# Patient Record
Sex: FEMALE | Race: WHITE | NOT HISPANIC OR LATINO | URBAN - METROPOLITAN AREA
[De-identification: names, ages, dates, MRNs, and addresses within clinical notes are randomized per-mention and may not be internally consistent; named-entity substitution may affect disease eponyms.]

---

## 2022-02-09 ENCOUNTER — APPOINTMENT (RX ONLY)
Dept: URBAN - METROPOLITAN AREA CLINIC 12 | Facility: CLINIC | Age: 27
Setting detail: DERMATOLOGY
End: 2022-02-09

## 2022-02-09 DIAGNOSIS — L65.0 TELOGEN EFFLUVIUM: ICD-10-CM | Status: INADEQUATELY CONTROLLED

## 2022-02-09 PROCEDURE — ? DIAGNOSIS COMMENT

## 2022-02-09 PROCEDURE — ? ADDITIONAL NOTES

## 2022-02-09 PROCEDURE — 99204 OFFICE O/P NEW MOD 45 MIN: CPT

## 2022-02-09 PROCEDURE — ? COUNSELING

## 2022-02-09 PROCEDURE — ? LAB REPORTS REVIEWED

## 2022-02-09 NOTE — PROCEDURE: ADDITIONAL NOTES
Detail Level: Simple
Render Risk Assessment In Note?: no
Additional Notes: Dd bx, monitor, rogaine, spirono.  Pt will monitor

## 2022-02-09 NOTE — HPI: HAIR LOSS
How Did The Hair Loss Occur?: sudden in onset
How Severe Is Your Hair Loss?: moderate
What Hair Products Do You Use?: Biomatrix
Additional History: Declines fbe

## 2022-05-09 ENCOUNTER — APPOINTMENT (RX ONLY)
Dept: URBAN - METROPOLITAN AREA CLINIC 12 | Facility: CLINIC | Age: 27
Setting detail: DERMATOLOGY
End: 2022-05-09

## 2022-05-09 DIAGNOSIS — L65.0 TELOGEN EFFLUVIUM: ICD-10-CM | Status: STABLE

## 2022-05-09 PROCEDURE — 99213 OFFICE O/P EST LOW 20 MIN: CPT

## 2022-05-09 PROCEDURE — ? LAB REPORTS REVIEWED

## 2022-05-09 PROCEDURE — ? DIAGNOSIS COMMENT

## 2022-05-09 PROCEDURE — ? COUNSELING

## 2022-05-09 PROCEDURE — ? ADDITIONAL NOTES

## 2022-05-09 NOTE — PROCEDURE: ADDITIONAL NOTES
Detail Level: Simple
Render Risk Assessment In Note?: no
Additional Notes: Dd bx, monitor, rogaine, spirono.  Pt will monitor.  Agreeable.  Hairline stable

## 2022-05-09 NOTE — PROCEDURE: DIAGNOSIS COMMENT
Comment: Unmasking FPHL?  No evidence of inflammatory conditions like LP.
Detail Level: Simple
Render Risk Assessment In Note?: no

## 2022-05-19 ENCOUNTER — APPOINTMENT (RX ONLY)
Dept: URBAN - METROPOLITAN AREA CLINIC 12 | Facility: CLINIC | Age: 27
Setting detail: DERMATOLOGY
End: 2022-05-19

## 2022-05-19 DIAGNOSIS — Z41.9 ENCOUNTER FOR PROCEDURE FOR PURPOSES OTHER THAN REMEDYING HEALTH STATE, UNSPECIFIED: ICD-10-CM

## 2022-05-19 PROCEDURE — ? COSMETIC CONSULTATION: GENERAL

## 2022-06-09 ENCOUNTER — APPOINTMENT (RX ONLY)
Dept: URBAN - METROPOLITAN AREA CLINIC 12 | Facility: CLINIC | Age: 27
Setting detail: DERMATOLOGY
End: 2022-06-09

## 2022-06-09 DIAGNOSIS — Z41.9 ENCOUNTER FOR PROCEDURE FOR PURPOSES OTHER THAN REMEDYING HEALTH STATE, UNSPECIFIED: ICD-10-CM

## 2022-06-09 PROCEDURE — ? HYDRAFACIAL

## 2022-06-09 NOTE — PROCEDURE: HYDRAFACIAL
Solution Override
Vacuum Pressure Low Setting (Will Not Render If Set To 0): 0
Tip: Hydropeel Tip, Blue
Tip Override
Price (Use Numbers Only, No Special Characters Or $): 229.00
Treatment Number: 1
Tip: Hydropeel Tip, Teal
Indication: skin texture
Consent: Written consent obtained, risks reviewed including but not limited to crusting, scabbing, blistering, scarring, darker or lighter pigmentary change, bruising, and/or incomplete response.
Comments: Bought bb cream and aha pads
Procedure: Extraction
Post-Care Instructions: I reviewed with the patient in detail post-care instructions. Patient should stay away from the sun and wear sun protection until treated areas are fully healed.
Tip: Hydropeel Tip, Clear
Procedure: Exfoliation
Vacuum Pressure Low Setting (Will Not Render If Set To 0): 15
Procedure: Peel
Location: face
Procedure: Fusion
Solution: Beta-HD
Number Of Passes: 3
Vacuum Pressure High Setting (Will Not Render If Set To 0): 16
Solution: GlySal 7.5%
Solution: Activ-4

## 2022-08-16 ENCOUNTER — APPOINTMENT (RX ONLY)
Dept: URBAN - METROPOLITAN AREA CLINIC 12 | Facility: CLINIC | Age: 27
Setting detail: DERMATOLOGY
End: 2022-08-16

## 2022-08-16 DIAGNOSIS — Z41.9 ENCOUNTER FOR PROCEDURE FOR PURPOSES OTHER THAN REMEDYING HEALTH STATE, UNSPECIFIED: ICD-10-CM

## 2022-08-16 PROCEDURE — ? HYDRAFACIAL

## 2022-08-16 NOTE — PROCEDURE: HYDRAFACIAL
Solution Override
Vacuum Pressure Low Setting (Will Not Render If Set To 0): 0
Tip: Hydropeel Tip, Orange Aggression
Tip Override
Price (Use Numbers Only, No Special Characters Or $): 229.00
Treatment Number: 2
Tip: Hydropeel Tip, Teal
Indication: skin texture
Consent: Written consent obtained, risks reviewed including but not limited to crusting, scabbing, blistering, scarring, darker or lighter pigmentary change, bruising, and/or incomplete response.
Comments: Bought bb cream and aha pads and retinol, ultra hydrating serum\\n\\n2nd visit \\nRecommended start using pads on just nose and chin, bought more cleanser, told to use a manual scrubber
Procedure: Extraction
Post-Care Instructions: I reviewed with the patient in detail post-care instructions. Patient should stay away from the sun and wear sun protection until treated areas are fully healed.
Tip: Hydropeel Tip, Clear
Procedure: Exfoliation
Vacuum Pressure Low Setting (Will Not Render If Set To 0): 15
Procedure: Peel
Location: face
Procedure: Fusion
Solution: Beta-HD
Number Of Passes: 3
Vacuum Pressure High Setting (Will Not Render If Set To 0): 16
Solution: GlySal 15%
Solution: Activ-4

## 2024-03-17 ENCOUNTER — HOSPITAL ENCOUNTER (EMERGENCY)
Age: 29
Discharge: HOME | End: 2024-03-17
Payer: COMMERCIAL

## 2024-03-17 ENCOUNTER — APPOINTMENT (OUTPATIENT)
Dept: CT IMAGING | Age: 29
End: 2024-03-17
Payer: COMMERCIAL

## 2024-03-17 VITALS
SYSTOLIC BLOOD PRESSURE: 140 MMHG | DIASTOLIC BLOOD PRESSURE: 87 MMHG | RESPIRATION RATE: 16 BRPM | HEART RATE: 80 BPM | OXYGEN SATURATION: 98 % | TEMPERATURE: 98 F | HEIGHT: 72 IN | WEIGHT: 180 LBS | BODY MASS INDEX: 24.38 KG/M2

## 2024-03-17 DIAGNOSIS — G43.809 OTHER MIGRAINE WITHOUT STATUS MIGRAINOSUS, NOT INTRACTABLE: Primary | ICD-10-CM

## 2024-03-17 LAB
ANION GAP SERPL CALCULATED.3IONS-SCNC: 11 MMOL/L (ref 7–16)
BASOPHILS # BLD: 0.04 K/UL (ref 0–0.2)
BASOPHILS NFR BLD: 0 % (ref 0–2)
BUN SERPL-MCNC: 9 MG/DL (ref 6–20)
CALCIUM SERPL-MCNC: 9.9 MG/DL (ref 8.6–10.2)
CHLORIDE SERPL-SCNC: 103 MMOL/L (ref 98–107)
CO2 SERPL-SCNC: 26 MMOL/L (ref 22–29)
CREAT SERPL-MCNC: 0.6 MG/DL (ref 0.5–1)
EOSINOPHIL # BLD: 0.12 K/UL (ref 0.05–0.5)
EOSINOPHILS RELATIVE PERCENT: 1 % (ref 0–6)
ERYTHROCYTE [DISTWIDTH] IN BLOOD BY AUTOMATED COUNT: 11.9 % (ref 11.5–15)
GFR SERPL CREATININE-BSD FRML MDRD: >60 ML/MIN/1.73M2
GLUCOSE SERPL-MCNC: 90 MG/DL (ref 74–99)
HCG, URINE, POC: NEGATIVE
HCT VFR BLD AUTO: 42.7 % (ref 34–48)
HGB BLD-MCNC: 14 G/DL (ref 11.5–15.5)
IMM GRANULOCYTES # BLD AUTO: 0.07 K/UL (ref 0–0.58)
IMM GRANULOCYTES NFR BLD: 1 % (ref 0–5)
LYMPHOCYTES NFR BLD: 1.57 K/UL (ref 1.5–4)
LYMPHOCYTES RELATIVE PERCENT: 12 % (ref 20–42)
Lab: NORMAL
MCH RBC QN AUTO: 31.3 PG (ref 26–35)
MCHC RBC AUTO-ENTMCNC: 32.8 G/DL (ref 32–34.5)
MCV RBC AUTO: 95.5 FL (ref 80–99.9)
MONOCYTES NFR BLD: 0.73 K/UL (ref 0.1–0.95)
MONOCYTES NFR BLD: 5 % (ref 2–12)
NEGATIVE QC PASS/FAIL: NORMAL
NEUTROPHILS NFR BLD: 82 % (ref 43–80)
NEUTS SEG NFR BLD: 11.14 K/UL (ref 1.8–7.3)
PLATELET # BLD AUTO: 272 K/UL (ref 130–450)
PMV BLD AUTO: 10.6 FL (ref 7–12)
POSITIVE QC PASS/FAIL: NORMAL
POTASSIUM SERPL-SCNC: 4.4 MMOL/L (ref 3.5–5)
RBC # BLD AUTO: 4.47 M/UL (ref 3.5–5.5)
SODIUM SERPL-SCNC: 140 MMOL/L (ref 132–146)
WBC OTHER # BLD: 13.7 K/UL (ref 4.5–11.5)

## 2024-03-17 PROCEDURE — 2580000003 HC RX 258: Performed by: NURSE PRACTITIONER

## 2024-03-17 PROCEDURE — 96374 THER/PROPH/DIAG INJ IV PUSH: CPT

## 2024-03-17 PROCEDURE — 70450 CT HEAD/BRAIN W/O DYE: CPT

## 2024-03-17 PROCEDURE — 80048 BASIC METABOLIC PNL TOTAL CA: CPT

## 2024-03-17 PROCEDURE — 6360000002 HC RX W HCPCS: Performed by: NURSE PRACTITIONER

## 2024-03-17 PROCEDURE — 6370000000 HC RX 637 (ALT 250 FOR IP): Performed by: NURSE PRACTITIONER

## 2024-03-17 PROCEDURE — 96375 TX/PRO/DX INJ NEW DRUG ADDON: CPT

## 2024-03-17 PROCEDURE — 72125 CT NECK SPINE W/O DYE: CPT

## 2024-03-17 PROCEDURE — 85025 COMPLETE CBC W/AUTO DIFF WBC: CPT

## 2024-03-17 PROCEDURE — 99284 EMERGENCY DEPT VISIT MOD MDM: CPT

## 2024-03-17 RX ORDER — METHYLPHENIDATE HYDROCHLORIDE 18 MG/1
18 TABLET ORAL EVERY MORNING
COMMUNITY

## 2024-03-17 RX ORDER — KETOROLAC TROMETHAMINE 30 MG/ML
30 INJECTION, SOLUTION INTRAMUSCULAR; INTRAVENOUS ONCE
Status: COMPLETED | OUTPATIENT
Start: 2024-03-17 | End: 2024-03-17

## 2024-03-17 RX ORDER — ACETAMINOPHEN 500 MG
1000 TABLET ORAL ONCE
Status: COMPLETED | OUTPATIENT
Start: 2024-03-17 | End: 2024-03-17

## 2024-03-17 RX ORDER — 0.9 % SODIUM CHLORIDE 0.9 %
1000 INTRAVENOUS SOLUTION INTRAVENOUS ONCE
Status: COMPLETED | OUTPATIENT
Start: 2024-03-17 | End: 2024-03-17

## 2024-03-17 RX ORDER — DIPHENHYDRAMINE HYDROCHLORIDE 50 MG/ML
25 INJECTION INTRAMUSCULAR; INTRAVENOUS ONCE
Status: COMPLETED | OUTPATIENT
Start: 2024-03-17 | End: 2024-03-17

## 2024-03-17 RX ORDER — METOCLOPRAMIDE HYDROCHLORIDE 5 MG/ML
10 INJECTION INTRAMUSCULAR; INTRAVENOUS ONCE
Status: COMPLETED | OUTPATIENT
Start: 2024-03-17 | End: 2024-03-17

## 2024-03-17 RX ADMIN — KETOROLAC TROMETHAMINE 30 MG: 30 INJECTION, SOLUTION INTRAMUSCULAR at 12:29

## 2024-03-17 RX ADMIN — ACETAMINOPHEN 1000 MG: 500 TABLET ORAL at 09:58

## 2024-03-17 RX ADMIN — METOCLOPRAMIDE 10 MG: 5 INJECTION, SOLUTION INTRAMUSCULAR; INTRAVENOUS at 10:04

## 2024-03-17 RX ADMIN — SODIUM CHLORIDE 1000 ML: 9 INJECTION, SOLUTION INTRAVENOUS at 09:59

## 2024-03-17 RX ADMIN — DIPHENHYDRAMINE HYDROCHLORIDE 25 MG: 50 INJECTION INTRAMUSCULAR; INTRAVENOUS at 10:04

## 2024-03-17 ASSESSMENT — PAIN DESCRIPTION - LOCATION: LOCATION: NECK

## 2024-03-17 ASSESSMENT — PAIN SCALES - GENERAL
PAINLEVEL_OUTOF10: 5
PAINLEVEL_OUTOF10: 6

## 2024-03-17 ASSESSMENT — PAIN DESCRIPTION - FREQUENCY: FREQUENCY: INTERMITTENT

## 2024-03-17 ASSESSMENT — PAIN DESCRIPTION - ONSET: ONSET: ON-GOING

## 2024-03-17 ASSESSMENT — LIFESTYLE VARIABLES
HOW MANY STANDARD DRINKS CONTAINING ALCOHOL DO YOU HAVE ON A TYPICAL DAY: PATIENT DOES NOT DRINK
HOW OFTEN DO YOU HAVE A DRINK CONTAINING ALCOHOL: NEVER

## 2024-03-17 NOTE — ED PROVIDER NOTES
Independent LILLI Visit.       [facility]  Department of Emergency Medicine   ED  Encounter Note  Admit Date/RoomTime: 3/17/2024  8:14 AM  ED Room: CHAIR01/    NAME: Pooja Berger  : 1995  MRN: [id number]     Chief Complaint:  Neck Pain (Denies trauma pain x 6 days, sharp radiating to forehead + nausea, denies photo phobia + chills, gradual onset worse today, recently started new ADHD meds )    History of Present Illness      Pooja Berger is a 28 y.o. old female who presents to the emergency department by private vehicle, for complaint of gradual onset, persistent right-sided unilateral aching and throbbing pain which began 1 week(s) prior to arrival.  Since onset the symptoms have been remaining constant and moderate in severity.  The patient has history of migraine headaches diagnosed in the past and tension headaches diagnosed in the past.   Today's episode is somewhat typical for her.  She has had states that she has been out of her triptan medication for the last 3 months and has not taken it for the in the past. The pain is associated with nausea and negative for numbness, weakness, speech or visual changes, syncope, seizures, amaurosis, diplopia, other visual changes, paralysis/weakness, numbness or tingling, involuntary movements, tremor, or speech impairment.  The symptoms are aggravated by palpation and relieved by nothing. There has been no history of recent trauma.  Treatment prior to arrival consisted of: acetaminophen, ibuprofen with minimal relief of symptoms.  She takes no blood thinning agents.    ROS   Pertinent positives and negatives are stated within HPI, all other systems reviewed and are negative.    Past Medical History:  has a past medical history of Depression and Headache.    Surgical History:  has a past surgical history that includes [place] tooth extraction and Dilation and curettage of uterus (N/A, 10/27/2019).    Social History:  reports that she

## 2024-05-12 SDOH — HEALTH STABILITY: PHYSICAL HEALTH: ON AVERAGE, HOW MANY MINUTES DO YOU ENGAGE IN EXERCISE AT THIS LEVEL?: 30 MIN

## 2024-05-12 SDOH — HEALTH STABILITY: PHYSICAL HEALTH: ON AVERAGE, HOW MANY DAYS PER WEEK DO YOU ENGAGE IN MODERATE TO STRENUOUS EXERCISE (LIKE A BRISK WALK)?: 5 DAYS

## 2024-05-14 ENCOUNTER — OFFICE VISIT (OUTPATIENT)
Dept: INTERNAL MEDICINE CLINIC | Age: 29
End: 2024-05-14
Payer: COMMERCIAL

## 2024-05-14 VITALS
WEIGHT: 167 LBS | TEMPERATURE: 96.4 F | HEIGHT: 72 IN | BODY MASS INDEX: 22.62 KG/M2 | OXYGEN SATURATION: 99 % | SYSTOLIC BLOOD PRESSURE: 126 MMHG | HEART RATE: 116 BPM | DIASTOLIC BLOOD PRESSURE: 82 MMHG

## 2024-05-14 DIAGNOSIS — D72.829 LEUKOCYTOSIS, UNSPECIFIED TYPE: ICD-10-CM

## 2024-05-14 DIAGNOSIS — Z72.0 TOBACCO USE: ICD-10-CM

## 2024-05-14 DIAGNOSIS — Z76.89 ENCOUNTER TO ESTABLISH CARE: Primary | ICD-10-CM

## 2024-05-14 DIAGNOSIS — G43.709 CHRONIC MIGRAINE WITHOUT AURA WITHOUT STATUS MIGRAINOSUS, NOT INTRACTABLE: ICD-10-CM

## 2024-05-14 DIAGNOSIS — F90.0 ATTENTION DEFICIT HYPERACTIVITY DISORDER (ADHD), PREDOMINANTLY INATTENTIVE TYPE: ICD-10-CM

## 2024-05-14 DIAGNOSIS — L08.9 INFECTED PIERCED LIP: ICD-10-CM

## 2024-05-14 DIAGNOSIS — S01.531A INFECTED PIERCED LIP: ICD-10-CM

## 2024-05-14 PROCEDURE — 99203 OFFICE O/P NEW LOW 30 MIN: CPT | Performed by: NEUROMUSCULOSKELETAL MEDICINE & OMM

## 2024-05-14 RX ORDER — VARENICLINE TARTRATE 0.5 MG/1
.5-1 TABLET, FILM COATED ORAL SEE ADMIN INSTRUCTIONS
Qty: 57 TABLET | Refills: 0 | Status: SHIPPED | OUTPATIENT
Start: 2024-05-14

## 2024-05-14 RX ORDER — LISDEXAMFETAMINE DIMESYLATE 30 MG/1
CAPSULE ORAL
COMMUNITY
Start: 2024-05-03

## 2024-05-14 RX ORDER — IBUPROFEN 200 MG
TABLET ORAL
Qty: 14 G | Refills: 2 | Status: SHIPPED | OUTPATIENT
Start: 2024-05-14

## 2024-05-14 SDOH — ECONOMIC STABILITY: FOOD INSECURITY: WITHIN THE PAST 12 MONTHS, THE FOOD YOU BOUGHT JUST DIDN'T LAST AND YOU DIDN'T HAVE MONEY TO GET MORE.: NEVER TRUE

## 2024-05-14 SDOH — ECONOMIC STABILITY: FOOD INSECURITY: WITHIN THE PAST 12 MONTHS, YOU WORRIED THAT YOUR FOOD WOULD RUN OUT BEFORE YOU GOT MONEY TO BUY MORE.: NEVER TRUE

## 2024-05-14 SDOH — ECONOMIC STABILITY: HOUSING INSECURITY
IN THE LAST 12 MONTHS, WAS THERE A TIME WHEN YOU DID NOT HAVE A STEADY PLACE TO SLEEP OR SLEPT IN A SHELTER (INCLUDING NOW)?: NO

## 2024-05-14 SDOH — ECONOMIC STABILITY: INCOME INSECURITY: HOW HARD IS IT FOR YOU TO PAY FOR THE VERY BASICS LIKE FOOD, HOUSING, MEDICAL CARE, AND HEATING?: NOT HARD AT ALL

## 2024-05-14 ASSESSMENT — ENCOUNTER SYMPTOMS
SHORTNESS OF BREATH: 0
BLOOD IN STOOL: 0
ABDOMINAL DISTENTION: 0
BACK PAIN: 0
DIARRHEA: 0
CONSTIPATION: 0
CHEST TIGHTNESS: 0
COUGH: 0
CHOKING: 0
VOMITING: 0
ABDOMINAL PAIN: 0

## 2024-05-14 ASSESSMENT — PATIENT HEALTH QUESTIONNAIRE - PHQ9
1. LITTLE INTEREST OR PLEASURE IN DOING THINGS: NOT AT ALL
SUM OF ALL RESPONSES TO PHQ QUESTIONS 1-9: 0
SUM OF ALL RESPONSES TO PHQ QUESTIONS 1-9: 0
2. FEELING DOWN, DEPRESSED OR HOPELESS: NOT AT ALL
SUM OF ALL RESPONSES TO PHQ QUESTIONS 1-9: 0
SUM OF ALL RESPONSES TO PHQ QUESTIONS 1-9: 0
SUM OF ALL RESPONSES TO PHQ9 QUESTIONS 1 & 2: 0

## 2024-05-14 NOTE — PROGRESS NOTES
stridor. No wheezing, rhonchi or rales.   Abdominal:      General: Bowel sounds are normal. There is no distension.      Palpations: Abdomen is soft. There is no mass.   Musculoskeletal:      Right lower leg: No edema.      Left lower leg: No edema.   Skin:     Findings: No lesion or rash.   Neurological:      General: No focal deficit present.      Mental Status: She is alert and oriented to person, place, and time. Mental status is at baseline.   Psychiatric:         Mood and Affect: Mood normal.         Behavior: Behavior normal.         Thought Content: Thought content normal.         Judgment: Judgment normal.         Health Maintenance Due   Topic Date Due    COVID-19 Vaccine (1) Never done    Varicella vaccine (1 of 2 - 2-dose childhood series) Never done    Pneumococcal 0-64 years Vaccine (1 of 2 - PCV) Never done    Depression Screen  Never done    HIV screen  Never done    Polio vaccine (2 of 3 - Adult catch-up series) 10/21/2011    Hepatitis A vaccine (2 of 2 - 2-dose series) 03/17/2013    Cervical cancer screen  04/30/2024     Last colonoscopy No colonoscopy on file   No cologuard on file  No FIT/FOBT on file   No flexible sigmoidoscopy on file   Last mammogram No breast cancer screening on file   Last dexa bone scan none found due to age.  Date of last Cervical Cancer screen (HPV or PAP): 4/30/2019   Last PSA               Past Medical History:   Diagnosis Date    ADHD (attention deficit hyperactivity disorder) 2016    Depression     Headache         Past Surgical History:   Procedure Laterality Date    DILATION AND CURETTAGE OF UTERUS N/A 10/27/2019    DILATATION AND CURETTAGE SUCTION performed by Claude Simmons MD at [facility] OR    WISDOM TOOTH EXTRACTION          The ASCVD Risk score (Mykel DK, et al., 2019) failed to calculate for the following reasons:    The 2019 ASCVD risk score is only valid for ages 40 to 79     Educational materials and/or home exercises printed for patient's review and were included

## 2024-07-31 ENCOUNTER — APPOINTMENT (OUTPATIENT)
Dept: CT IMAGING | Age: 29
End: 2024-07-31
Payer: COMMERCIAL

## 2024-07-31 ENCOUNTER — HOSPITAL ENCOUNTER (EMERGENCY)
Age: 29
Discharge: HOME | End: 2024-07-31
Attending: EMERGENCY MEDICINE
Payer: COMMERCIAL

## 2024-07-31 VITALS
HEART RATE: 78 BPM | BODY MASS INDEX: 22.38 KG/M2 | WEIGHT: 165 LBS | TEMPERATURE: 98 F | DIASTOLIC BLOOD PRESSURE: 74 MMHG | OXYGEN SATURATION: 98 % | SYSTOLIC BLOOD PRESSURE: 128 MMHG | RESPIRATION RATE: 16 BRPM

## 2024-07-31 DIAGNOSIS — R51.9 HEADACHE, UNSPECIFIED HEADACHE TYPE: Primary | ICD-10-CM

## 2024-07-31 LAB
ALBUMIN SERPL-MCNC: 4.3 G/DL (ref 3.5–5.2)
ALP SERPL-CCNC: 50 U/L (ref 35–104)
ALT SERPL-CCNC: 13 U/L (ref 0–32)
ANION GAP SERPL CALCULATED.3IONS-SCNC: 15 MMOL/L (ref 7–16)
AST SERPL-CCNC: 14 U/L (ref 0–31)
BASOPHILS # BLD: 0.03 K/UL (ref 0–0.2)
BASOPHILS NFR BLD: 0 % (ref 0–2)
BILIRUB SERPL-MCNC: 0.2 MG/DL (ref 0–1.2)
BUN SERPL-MCNC: 13 MG/DL (ref 6–20)
CALCIUM SERPL-MCNC: 9.2 MG/DL (ref 8.6–10.2)
CHLORIDE SERPL-SCNC: 102 MMOL/L (ref 98–107)
CO2 SERPL-SCNC: 23 MMOL/L (ref 22–29)
CREAT SERPL-MCNC: 0.9 MG/DL (ref 0.5–1)
EOSINOPHIL # BLD: 0.14 K/UL (ref 0.05–0.5)
EOSINOPHILS RELATIVE PERCENT: 1 % (ref 0–6)
ERYTHROCYTE [DISTWIDTH] IN BLOOD BY AUTOMATED COUNT: 11.8 % (ref 11.5–15)
GFR, ESTIMATED: 89 ML/MIN/1.73M2
GLUCOSE SERPL-MCNC: 98 MG/DL (ref 74–99)
HCG, URINE, POC: NEGATIVE
HCT VFR BLD AUTO: 37.5 % (ref 34–48)
HGB BLD-MCNC: 12.6 G/DL (ref 11.5–15.5)
IMM GRANULOCYTES # BLD AUTO: <0.03 K/UL (ref 0–0.58)
IMM GRANULOCYTES NFR BLD: 0 % (ref 0–5)
LYMPHOCYTES NFR BLD: 3.08 K/UL (ref 1.5–4)
LYMPHOCYTES RELATIVE PERCENT: 32 % (ref 20–42)
Lab: NORMAL
MCH RBC QN AUTO: 31.8 PG (ref 26–35)
MCHC RBC AUTO-ENTMCNC: 33.6 G/DL (ref 32–34.5)
MCV RBC AUTO: 94.7 FL (ref 80–99.9)
MONOCYTES NFR BLD: 0.7 K/UL (ref 0.1–0.95)
MONOCYTES NFR BLD: 7 % (ref 2–12)
NEGATIVE QC PASS/FAIL: NORMAL
NEUTROPHILS NFR BLD: 59 % (ref 43–80)
NEUTS SEG NFR BLD: 5.75 K/UL (ref 1.8–7.3)
PLATELET # BLD AUTO: 268 K/UL (ref 130–450)
PMV BLD AUTO: 10.4 FL (ref 7–12)
POSITIVE QC PASS/FAIL: NORMAL
POTASSIUM SERPL-SCNC: 3.4 MMOL/L (ref 3.5–5)
PROT SERPL-MCNC: 7.3 G/DL (ref 6.4–8.3)
RBC # BLD AUTO: 3.96 M/UL (ref 3.5–5.5)
SODIUM SERPL-SCNC: 140 MMOL/L (ref 132–146)
WBC OTHER # BLD: 9.7 K/UL (ref 4.5–11.5)

## 2024-07-31 PROCEDURE — 6360000002 HC RX W HCPCS: Performed by: EMERGENCY MEDICINE

## 2024-07-31 PROCEDURE — 96375 TX/PRO/DX INJ NEW DRUG ADDON: CPT

## 2024-07-31 PROCEDURE — 99285 EMERGENCY DEPT VISIT HI MDM: CPT

## 2024-07-31 PROCEDURE — 2580000003 HC RX 258: Performed by: RADIOLOGY

## 2024-07-31 PROCEDURE — 6360000004 HC RX CONTRAST MEDICATION: Performed by: RADIOLOGY

## 2024-07-31 PROCEDURE — 80053 COMPREHEN METABOLIC PANEL: CPT

## 2024-07-31 PROCEDURE — 96374 THER/PROPH/DIAG INJ IV PUSH: CPT

## 2024-07-31 PROCEDURE — 85025 COMPLETE CBC W/AUTO DIFF WBC: CPT

## 2024-07-31 PROCEDURE — 70496 CT ANGIOGRAPHY HEAD: CPT

## 2024-07-31 RX ORDER — DIPHENHYDRAMINE HYDROCHLORIDE 50 MG/ML
25 INJECTION INTRAMUSCULAR; INTRAVENOUS ONCE
Status: COMPLETED | OUTPATIENT
Start: 2024-07-31 | End: 2024-07-31

## 2024-07-31 RX ORDER — METOCLOPRAMIDE HYDROCHLORIDE 5 MG/ML
10 INJECTION INTRAMUSCULAR; INTRAVENOUS ONCE
Status: COMPLETED | OUTPATIENT
Start: 2024-07-31 | End: 2024-07-31

## 2024-07-31 RX ORDER — SODIUM CHLORIDE 0.9 % (FLUSH) 0.9 %
10 SYRINGE (ML) INJECTION PRN
Status: DISCONTINUED | OUTPATIENT
Start: 2024-07-31 | End: 2024-07-31 | Stop reason: HOSPADM

## 2024-07-31 RX ORDER — KETOROLAC TROMETHAMINE 30 MG/ML
15 INJECTION, SOLUTION INTRAMUSCULAR; INTRAVENOUS ONCE
Status: COMPLETED | OUTPATIENT
Start: 2024-07-31 | End: 2024-07-31

## 2024-07-31 RX ADMIN — KETOROLAC TROMETHAMINE 15 MG: 30 INJECTION, SOLUTION INTRAMUSCULAR at 04:34

## 2024-07-31 RX ADMIN — Medication 10 ML: at 03:27

## 2024-07-31 RX ADMIN — IOPAMIDOL 75 ML: 755 INJECTION, SOLUTION INTRAVENOUS at 03:16

## 2024-07-31 RX ADMIN — DIPHENHYDRAMINE HYDROCHLORIDE 25 MG: 50 INJECTION INTRAMUSCULAR; INTRAVENOUS at 03:02

## 2024-07-31 RX ADMIN — METOCLOPRAMIDE 10 MG: 5 INJECTION, SOLUTION INTRAMUSCULAR; INTRAVENOUS at 03:00

## 2024-07-31 ASSESSMENT — PAIN DESCRIPTION - DESCRIPTORS
DESCRIPTORS: STABBING;DISCOMFORT;PRESSURE
DESCRIPTORS: CRUSHING

## 2024-07-31 ASSESSMENT — PAIN DESCRIPTION - ORIENTATION
ORIENTATION: ANTERIOR
ORIENTATION: POSTERIOR

## 2024-07-31 ASSESSMENT — LIFESTYLE VARIABLES: HOW OFTEN DO YOU HAVE A DRINK CONTAINING ALCOHOL: NEVER

## 2024-07-31 ASSESSMENT — PAIN - FUNCTIONAL ASSESSMENT
PAIN_FUNCTIONAL_ASSESSMENT: ACTIVITIES ARE NOT PREVENTED
PAIN_FUNCTIONAL_ASSESSMENT: 0-10

## 2024-07-31 ASSESSMENT — PAIN DESCRIPTION - LOCATION
LOCATION: HEAD
LOCATION: HEAD

## 2024-07-31 ASSESSMENT — PAIN SCALES - GENERAL
PAINLEVEL_OUTOF10: 6
PAINLEVEL_OUTOF10: 10

## 2024-07-31 NOTE — ED PROVIDER NOTES
HPI:  7/31/24, Time: 2:38 AM EDT         Pooja Berger is a 29 y.o. female history of ADHD depression history of migraine presenting to the ED for sudden onset of headache, beginning short time ago.  The complaint has been persistent, moderate in severity, and worsened by nothing.  Patient presenting her baseline onset of headache.  Patient reporting nausea vomiting patient also reporting photophobia.  Patient reporting no loss of vision patient reporting numbness in her arms and legs.  Patient reporting no upper or lower extremity weakness or slurred speech.  Patient reporting no chest pain or difficulty breathing patient reporting no urinary symptoms.  Patient reports history of headaches but this was slightly worse.  She was just recently seen in [place] for the same headache and July.    ROS:   Pertinent positives and negatives are stated within HPI, all other systems reviewed and are negative.  --------------------------------------------- PAST HISTORY ---------------------------------------------  Past Medical History:  has a past medical history of ADHD (attention deficit hyperactivity disorder), Depression, and Headache.    Past Surgical History:  has a past surgical history that includes [place] tooth extraction and Dilation and curettage of uterus (N/A, 10/27/2019).    Social History:  reports that she has been smoking cigarettes. She has a 5.0 pack-year smoking history. She has never used smokeless tobacco. She reports current alcohol use. She reports that she does not use drugs.    Family History: family history includes Arthritis in her father; Coronary Art Dis in her father; Heart Attack in her father; High Blood Pressure in her father; High Cholesterol in her father; Miscarriages / Stillbirths in her mother; No Known Problems in her brother and sister.     The patient’s home medications have been reviewed.    Allergies: Patient has no known

## 2024-07-31 NOTE — ED NOTES
Radiology Procedure Waiver   Name: Pooja Berger  : 1995  MRN: [id number]    Date:  24    Time: 2:35 AM EDT    Benefits of immediately proceeding with Radiology exam(s) without pre-testing outweigh the risks or are not indicated as specified below and therefore the following is/are being waived:    [x] Pregnancy test   [] Patients LMP on-time and regular.   [] Patient had Tubal Ligation or has other Contraception Device.   [] Patient  is Menopausal or Premenarcheal.    [] Patient had Full or Partial Hysterectomy.    [] Protocol for Iodine allergy    [] MRI Questionnaire     [x] BUN/Creatinine   [] Patient age w/no hx of renal dysfunction.   [] Patient on Dialysis.   [] Recent Normal Labs.  Electronically signed by Titi Stark MD on 24 at 2:35 AM EDT               Titi Stark MD  [id number]

## 2024-08-20 ENCOUNTER — OFFICE VISIT (OUTPATIENT)
Dept: INTERNAL MEDICINE CLINIC | Age: 29
End: 2024-08-20
Payer: COMMERCIAL

## 2024-08-20 VITALS
WEIGHT: 164 LBS | BODY MASS INDEX: 22.21 KG/M2 | TEMPERATURE: 98.7 F | SYSTOLIC BLOOD PRESSURE: 136 MMHG | DIASTOLIC BLOOD PRESSURE: 82 MMHG | HEART RATE: 108 BPM | OXYGEN SATURATION: 99 % | HEIGHT: 72 IN

## 2024-08-20 DIAGNOSIS — G43.709 CHRONIC MIGRAINE WITHOUT AURA WITHOUT STATUS MIGRAINOSUS, NOT INTRACTABLE: Primary | ICD-10-CM

## 2024-08-20 PROBLEM — F43.25 ADJUSTMENT DISORDER WITH MIXED DISTURBANCE OF EMOTIONS AND CONDUCT: Status: ACTIVE | Noted: 2024-08-20

## 2024-08-20 PROBLEM — N92.6 IRREGULAR MENSTRUAL CYCLE: Status: ACTIVE | Noted: 2024-08-20

## 2024-08-20 PROBLEM — J32.9 SINUSITIS: Status: ACTIVE | Noted: 2024-08-20

## 2024-08-20 PROBLEM — F60.3 BORDERLINE PERSONALITY DISORDER (HCC): Status: ACTIVE | Noted: 2024-08-20

## 2024-08-20 PROCEDURE — G2211 COMPLEX E/M VISIT ADD ON: HCPCS | Performed by: NEUROMUSCULOSKELETAL MEDICINE & OMM

## 2024-08-20 PROCEDURE — 99214 OFFICE O/P EST MOD 30 MIN: CPT | Performed by: NEUROMUSCULOSKELETAL MEDICINE & OMM

## 2024-08-20 RX ORDER — VILOXAZINE HYDROCHLORIDE 200 MG/1
CAPSULE, EXTENDED RELEASE ORAL
COMMUNITY
Start: 2024-06-20

## 2024-08-20 RX ORDER — ETONOGESTREL 68 MG/1
68 IMPLANT SUBCUTANEOUS ONCE
COMMUNITY

## 2024-08-20 ASSESSMENT — ENCOUNTER SYMPTOMS
BACK PAIN: 0
ABDOMINAL DISTENTION: 0
ANAL BLEEDING: 0
VOMITING: 0
NAUSEA: 0
CHEST TIGHTNESS: 0
COUGH: 0
CHOKING: 0
SHORTNESS OF BREATH: 0
ABDOMINAL PAIN: 0
DIARRHEA: 0
CONSTIPATION: 0
WHEEZING: 0

## 2024-08-20 NOTE — PROGRESS NOTES
feels comfortable with and wishes to proceed withabove treatment plan.     Advised patient to call with any new medication issues, and read all Rx infofrom pharmacy to assure aware of all possible risks and side effects of medication before taking.   age and gender appropriate health screening exams and vaccinations.  Advised patient regarding importance of keeping up with recommended health maintenance and to schedule as soon as possible if overdue, as this isimportant in assessing for undiagnosed pathology, especially cancer, as well as protecting against potentially harmful/life threatening disease.          Patient and/or guardian verbalizes understanding andagrees with above counseling, assessment and plan.     All questions answered.      An electronic signature was used to authenticate this note.    --Seamus Turner, DO

## 2024-08-21 ENCOUNTER — HOSPITAL ENCOUNTER (EMERGENCY)
Age: 29
Discharge: HOME | End: 2024-08-21
Payer: COMMERCIAL

## 2024-08-21 VITALS
OXYGEN SATURATION: 99 % | SYSTOLIC BLOOD PRESSURE: 136 MMHG | TEMPERATURE: 98.4 F | DIASTOLIC BLOOD PRESSURE: 80 MMHG | RESPIRATION RATE: 14 BRPM | HEART RATE: 77 BPM

## 2024-08-21 DIAGNOSIS — G44.89 HEADACHE SYNDROME: Primary | ICD-10-CM

## 2024-08-21 PROCEDURE — 99284 EMERGENCY DEPT VISIT MOD MDM: CPT

## 2024-08-21 PROCEDURE — 6360000002 HC RX W HCPCS: Performed by: NURSE PRACTITIONER

## 2024-08-21 PROCEDURE — 96365 THER/PROPH/DIAG IV INF INIT: CPT

## 2024-08-21 PROCEDURE — 2580000003 HC RX 258: Performed by: NURSE PRACTITIONER

## 2024-08-21 PROCEDURE — 96375 TX/PRO/DX INJ NEW DRUG ADDON: CPT

## 2024-08-21 RX ORDER — MAGNESIUM SULFATE IN WATER 40 MG/ML
2000 INJECTION, SOLUTION INTRAVENOUS ONCE
Status: COMPLETED | OUTPATIENT
Start: 2024-08-21 | End: 2024-08-21

## 2024-08-21 RX ORDER — DIPHENHYDRAMINE HYDROCHLORIDE 50 MG/ML
25 INJECTION INTRAMUSCULAR; INTRAVENOUS ONCE
Status: COMPLETED | OUTPATIENT
Start: 2024-08-21 | End: 2024-08-21

## 2024-08-21 RX ORDER — METOCLOPRAMIDE HYDROCHLORIDE 5 MG/ML
10 INJECTION INTRAMUSCULAR; INTRAVENOUS ONCE
Status: COMPLETED | OUTPATIENT
Start: 2024-08-21 | End: 2024-08-21

## 2024-08-21 RX ORDER — 0.9 % SODIUM CHLORIDE 0.9 %
1000 INTRAVENOUS SOLUTION INTRAVENOUS ONCE
Status: COMPLETED | OUTPATIENT
Start: 2024-08-21 | End: 2024-08-21

## 2024-08-21 RX ORDER — KETOROLAC TROMETHAMINE 30 MG/ML
15 INJECTION, SOLUTION INTRAMUSCULAR; INTRAVENOUS ONCE
Status: COMPLETED | OUTPATIENT
Start: 2024-08-21 | End: 2024-08-21

## 2024-08-21 RX ADMIN — METOCLOPRAMIDE 10 MG: 5 INJECTION, SOLUTION INTRAMUSCULAR; INTRAVENOUS at 19:33

## 2024-08-21 RX ADMIN — SODIUM CHLORIDE 1000 ML: 9 INJECTION, SOLUTION INTRAVENOUS at 19:33

## 2024-08-21 RX ADMIN — DIPHENHYDRAMINE HYDROCHLORIDE 25 MG: 50 INJECTION INTRAMUSCULAR; INTRAVENOUS at 19:33

## 2024-08-21 RX ADMIN — KETOROLAC TROMETHAMINE 15 MG: 30 INJECTION, SOLUTION INTRAMUSCULAR at 19:33

## 2024-08-21 RX ADMIN — MAGNESIUM SULFATE HEPTAHYDRATE 2000 MG: 40 INJECTION, SOLUTION INTRAVENOUS at 19:36

## 2024-08-21 ASSESSMENT — PAIN DESCRIPTION - ONSET: ONSET: SUDDEN

## 2024-08-21 ASSESSMENT — PAIN DESCRIPTION - DESCRIPTORS
DESCRIPTORS: SHARP;DISCOMFORT;THROBBING
DESCRIPTORS: ACHING;CRUSHING;PRESSURE

## 2024-08-21 ASSESSMENT — PAIN SCALES - GENERAL
PAINLEVEL_OUTOF10: 10
PAINLEVEL_OUTOF10: 10

## 2024-08-21 ASSESSMENT — PAIN DESCRIPTION - LOCATION
LOCATION: HEAD
LOCATION: HEAD

## 2024-08-21 ASSESSMENT — PAIN DESCRIPTION - PAIN TYPE: TYPE: ACUTE PAIN

## 2024-08-21 ASSESSMENT — PAIN - FUNCTIONAL ASSESSMENT: PAIN_FUNCTIONAL_ASSESSMENT: 0-10

## 2024-08-21 ASSESSMENT — PAIN DESCRIPTION - FREQUENCY: FREQUENCY: CONTINUOUS

## 2024-08-22 NOTE — ED PROVIDER NOTES
Independent LILLI Visit.        [facility]  Department of Emergency Medicine   ED  Encounter Note  Admit Date/RoomTime: 2024  6:44 PM  ED Room:     NAME: Pooja Berger  : 1995  MRN: [id number]     Chief Complaint:  Migraine (With vomiting. )    History of Present Illness      Pooja Beregr is a 29 y.o. old female who presents to the emergency department by private vehicle, for complaint of gradual onset frontal aching and pressure pain which began 4 hour(s) prior to arrival.  Since onset the symptoms have been recurrent and worsening and moderate in severity.  The patient has history of headaches of unknown type diagnosed in the past.   Today's episode is typical for her.  She has had CT Brain, pain management, PCP evaluation in the past. The pain is associated with nausea, vomiting, sonophobia, and photophobia and negative for numbness, weakness, speech or visual changes, syncope, seizures, amaurosis, diplopia, other visual changes, paralysis/weakness, numbness or tingling, involuntary movements, tremor, or speech impairment.  The symptoms are aggravated by movement and relieved by nothing. There has been no history of recent trauma.  Treatment prior to arrival consisted of: acetaminophen with no relief of symptoms.  She takes no blood thinning agents.  This is not the worst headache of her life, is not exertional, states this is her typical headache with no changes.  Is waiting for Nurtec to be approved by her insurance company as prescribed by her PCP.    ROS   Pertinent positives and negatives are stated within HPI, all other systems reviewed and are negative.    Past Medical History:  has a past medical history of ADHD (attention deficit hyperactivity disorder), Depression, and Headache.    Surgical History:  has a past surgical history that includes [place] tooth extraction and Dilation and curettage of uterus (N/A, 10/27/2019).    Social History:  reports that she

## 2024-09-03 ENCOUNTER — TELEPHONE (OUTPATIENT)
Dept: INTERNAL MEDICINE CLINIC | Age: 29
End: 2024-09-03

## 2024-09-03 DIAGNOSIS — R30.0 DYSURIA: Primary | ICD-10-CM

## 2024-09-03 RX ORDER — NITROFURANTOIN 25; 75 MG/1; MG/1
100 CAPSULE ORAL 2 TIMES DAILY
Qty: 14 CAPSULE | Refills: 0 | Status: SHIPPED | OUTPATIENT
Start: 2024-09-03 | End: 2024-09-10

## 2024-09-03 NOTE — TELEPHONE ENCOUNTER
Pt called c/o uti symptoms, frequency, pain, pts ua positive for blood, nitrates and leuks. Sent for culture and microscopy. Pt requesting something called into pharmacy until culture comes back. Pt uses [facility]

## 2024-09-04 NOTE — TELEPHONE ENCOUNTER
Macrobid 100mg one by mouth twice a day for 7 days, dispensed #14 with no refills to pharmacy today.

## 2024-09-19 PROBLEM — J32.9 SINUSITIS: Status: RESOLVED | Noted: 2024-08-20 | Resolved: 2024-09-19

## 2024-09-24 ENCOUNTER — OFFICE VISIT (OUTPATIENT)
Dept: INTERNAL MEDICINE CLINIC | Age: 29
End: 2024-09-24
Payer: COMMERCIAL

## 2024-09-24 VITALS
OXYGEN SATURATION: 100 % | HEIGHT: 72 IN | WEIGHT: 158.2 LBS | DIASTOLIC BLOOD PRESSURE: 78 MMHG | HEART RATE: 104 BPM | BODY MASS INDEX: 21.43 KG/M2 | SYSTOLIC BLOOD PRESSURE: 122 MMHG | TEMPERATURE: 97 F

## 2024-09-24 DIAGNOSIS — R82.90 BAD ODOR OF URINE: Primary | ICD-10-CM

## 2024-09-24 DIAGNOSIS — Z72.0 TOBACCO USE: ICD-10-CM

## 2024-09-24 DIAGNOSIS — G43.709 CHRONIC MIGRAINE WITHOUT AURA WITHOUT STATUS MIGRAINOSUS, NOT INTRACTABLE: ICD-10-CM

## 2024-09-24 DIAGNOSIS — R11.0 NAUSEA IN ADULT: ICD-10-CM

## 2024-09-24 DIAGNOSIS — R82.998 LEUKOCYTES IN URINE: ICD-10-CM

## 2024-09-24 LAB
BILIRUBIN, POC: NORMAL
BLOOD URINE, POC: NORMAL
CLARITY, POC: CLEAR
COLOR, POC: YELLOW
GLUCOSE URINE, POC: NORMAL MG/DL
KETONES, POC: NORMAL MG/DL
LEUKOCYTE EST, POC: NORMAL
NITRITE, POC: NORMAL
PH, POC: 7.5
PROTEIN, POC: NORMAL MG/DL
SPECIFIC GRAVITY, POC: 1.01
UROBILINOGEN, POC: 0.2 MG/DL

## 2024-09-24 PROCEDURE — G2211 COMPLEX E/M VISIT ADD ON: HCPCS | Performed by: NEUROMUSCULOSKELETAL MEDICINE & OMM

## 2024-09-24 PROCEDURE — 99214 OFFICE O/P EST MOD 30 MIN: CPT | Performed by: NEUROMUSCULOSKELETAL MEDICINE & OMM

## 2024-09-24 PROCEDURE — 81002 URINALYSIS NONAUTO W/O SCOPE: CPT | Performed by: NEUROMUSCULOSKELETAL MEDICINE & OMM

## 2024-09-24 RX ORDER — ONDANSETRON 4 MG/1
4 TABLET, ORALLY DISINTEGRATING ORAL 3 TIMES DAILY PRN
Qty: 30 TABLET | Refills: 0 | Status: ON HOLD | OUTPATIENT
Start: 2024-09-24

## 2024-09-24 RX ORDER — ARIPIPRAZOLE 2 MG/1
2 TABLET ORAL DAILY
Status: ON HOLD | COMMUNITY
Start: 2024-08-19

## 2024-09-24 ASSESSMENT — ENCOUNTER SYMPTOMS
CHOKING: 0
BLOOD IN STOOL: 0
NAUSEA: 1
VOMITING: 0
CHEST TIGHTNESS: 0
COUGH: 0
SHORTNESS OF BREATH: 0
ABDOMINAL DISTENTION: 0
WHEEZING: 0
BACK PAIN: 0
ABDOMINAL PAIN: 0
DIARRHEA: 0
CONSTIPATION: 0

## 2024-09-26 ENCOUNTER — HOSPITAL ENCOUNTER (INPATIENT)
Age: 29
LOS: 3 days | Discharge: HOME | DRG: 885 | End: 2024-09-30
Attending: STUDENT IN AN ORGANIZED HEALTH CARE EDUCATION/TRAINING PROGRAM | Admitting: PSYCHIATRY & NEUROLOGY
Payer: COMMERCIAL

## 2024-09-26 DIAGNOSIS — R45.851 SUICIDAL IDEATION: Primary | ICD-10-CM

## 2024-09-26 DIAGNOSIS — R82.998 LEUKOCYTES IN URINE: Primary | ICD-10-CM

## 2024-09-26 LAB
ALBUMIN SERPL-MCNC: 4.5 G/DL (ref 3.5–5.2)
ALP SERPL-CCNC: 43 U/L (ref 35–104)
ALT SERPL-CCNC: 9 U/L (ref 0–32)
AMPHET UR QL SCN: NEGATIVE
ANION GAP SERPL CALCULATED.3IONS-SCNC: 14 MMOL/L (ref 7–16)
APAP SERPL-MCNC: <5 UG/ML (ref 10–30)
AST SERPL-CCNC: 11 U/L (ref 0–31)
BARBITURATES UR QL SCN: NEGATIVE
BASOPHILS # BLD: 0.03 K/UL (ref 0–0.2)
BASOPHILS NFR BLD: 0 % (ref 0–2)
BENZODIAZ UR QL: NEGATIVE
BILIRUB SERPL-MCNC: 0.4 MG/DL (ref 0–1.2)
BUN SERPL-MCNC: 10 MG/DL (ref 6–20)
BUPRENORPHINE UR QL: NEGATIVE
CALCIUM SERPL-MCNC: 9.7 MG/DL (ref 8.6–10.2)
CANNABINOIDS UR QL SCN: NEGATIVE
CHLORIDE SERPL-SCNC: 104 MMOL/L (ref 98–107)
CO2 SERPL-SCNC: 21 MMOL/L (ref 22–29)
COCAINE UR QL SCN: NEGATIVE
CREAT SERPL-MCNC: 0.7 MG/DL (ref 0.5–1)
EOSINOPHIL # BLD: 0.06 K/UL (ref 0.05–0.5)
EOSINOPHILS RELATIVE PERCENT: 1 % (ref 0–6)
ERYTHROCYTE [DISTWIDTH] IN BLOOD BY AUTOMATED COUNT: 11.8 % (ref 11.5–15)
ETHANOLAMINE SERPL-MCNC: <10 MG/DL (ref 0–0.08)
FENTANYL UR QL: NEGATIVE
GFR, ESTIMATED: >90 ML/MIN/1.73M2
GLUCOSE SERPL-MCNC: 95 MG/DL (ref 74–99)
HCG, URINE, POC: NEGATIVE
HCT VFR BLD AUTO: 39.4 % (ref 34–48)
HGB BLD-MCNC: 13.2 G/DL (ref 11.5–15.5)
IMM GRANULOCYTES # BLD AUTO: <0.03 K/UL (ref 0–0.58)
IMM GRANULOCYTES NFR BLD: 0 % (ref 0–5)
LYMPHOCYTES NFR BLD: 2.11 K/UL (ref 1.5–4)
LYMPHOCYTES RELATIVE PERCENT: 20 % (ref 20–42)
Lab: NORMAL
MCH RBC QN AUTO: 32.1 PG (ref 26–35)
MCHC RBC AUTO-ENTMCNC: 33.5 G/DL (ref 32–34.5)
MCV RBC AUTO: 95.9 FL (ref 80–99.9)
METHADONE UR QL: NEGATIVE
MONOCYTES NFR BLD: 0.78 K/UL (ref 0.1–0.95)
MONOCYTES NFR BLD: 8 % (ref 2–12)
NEGATIVE QC PASS/FAIL: NORMAL
NEUTROPHILS NFR BLD: 71 % (ref 43–80)
NEUTS SEG NFR BLD: 7.38 K/UL (ref 1.8–7.3)
OPIATES UR QL SCN: NEGATIVE
OXYCODONE UR QL SCN: NEGATIVE
PCP UR QL SCN: NEGATIVE
PLATELET # BLD AUTO: 241 K/UL (ref 130–450)
PMV BLD AUTO: 10.4 FL (ref 7–12)
POSITIVE QC PASS/FAIL: NORMAL
POTASSIUM SERPL-SCNC: 3.8 MMOL/L (ref 3.5–5)
PROT SERPL-MCNC: 7.4 G/DL (ref 6.4–8.3)
RBC # BLD AUTO: 4.11 M/UL (ref 3.5–5.5)
SALICYLATES SERPL-MCNC: <0.3 MG/DL (ref 0–30)
SODIUM SERPL-SCNC: 139 MMOL/L (ref 132–146)
TEST INFORMATION: NORMAL
TOXIC TRICYCLIC SC,BLOOD: NEGATIVE
WBC OTHER # BLD: 10.4 K/UL (ref 4.5–11.5)

## 2024-09-26 PROCEDURE — 80053 COMPREHEN METABOLIC PANEL: CPT

## 2024-09-26 PROCEDURE — 80179 DRUG ASSAY SALICYLATE: CPT

## 2024-09-26 PROCEDURE — 80143 DRUG ASSAY ACETAMINOPHEN: CPT

## 2024-09-26 PROCEDURE — 93005 ELECTROCARDIOGRAM TRACING: CPT | Performed by: STUDENT IN AN ORGANIZED HEALTH CARE EDUCATION/TRAINING PROGRAM

## 2024-09-26 PROCEDURE — 80307 DRUG TEST PRSMV CHEM ANLYZR: CPT

## 2024-09-26 PROCEDURE — 85025 COMPLETE CBC W/AUTO DIFF WBC: CPT

## 2024-09-26 PROCEDURE — G0480 DRUG TEST DEF 1-7 CLASSES: HCPCS

## 2024-09-26 PROCEDURE — 99285 EMERGENCY DEPT VISIT HI MDM: CPT

## 2024-09-26 RX ORDER — CIPROFLOXACIN 500 MG/1
500 TABLET, FILM COATED ORAL 2 TIMES DAILY
Qty: 14 TABLET | Refills: 0 | Status: ON HOLD | OUTPATIENT
Start: 2024-09-26 | End: 2024-10-03

## 2024-09-26 ASSESSMENT — PAIN - FUNCTIONAL ASSESSMENT: PAIN_FUNCTIONAL_ASSESSMENT: NONE - DENIES PAIN

## 2024-09-26 ASSESSMENT — LIFESTYLE VARIABLES
HOW OFTEN DO YOU HAVE A DRINK CONTAINING ALCOHOL: MONTHLY OR LESS
HOW MANY STANDARD DRINKS CONTAINING ALCOHOL DO YOU HAVE ON A TYPICAL DAY: 1 OR 2

## 2024-09-27 PROBLEM — F32.9 MAJOR DEPRESSIVE DISORDER WITHOUT PSYCHOTIC FEATURES: Status: ACTIVE | Noted: 2024-09-27

## 2024-09-27 LAB
CULTURE: ABNORMAL
EKG ATRIAL RATE: 99 BPM
EKG P AXIS: 77 DEGREES
EKG P-R INTERVAL: 140 MS
EKG Q-T INTERVAL: 338 MS
EKG QRS DURATION: 86 MS
EKG QTC CALCULATION (BAZETT): 433 MS
EKG R AXIS: 82 DEGREES
EKG T AXIS: 56 DEGREES
EKG VENTRICULAR RATE: 99 BPM
SPECIMEN DESCRIPTION: ABNORMAL

## 2024-09-27 PROCEDURE — 90792 PSYCH DIAG EVAL W/MED SRVCS: CPT | Performed by: NURSE PRACTITIONER

## 2024-09-27 PROCEDURE — 6370000000 HC RX 637 (ALT 250 FOR IP): Performed by: PSYCHIATRY & NEUROLOGY

## 2024-09-27 PROCEDURE — 6370000000 HC RX 637 (ALT 250 FOR IP): Performed by: NURSE PRACTITIONER

## 2024-09-27 PROCEDURE — 93010 ELECTROCARDIOGRAM REPORT: CPT | Performed by: INTERNAL MEDICINE

## 2024-09-27 PROCEDURE — 1240000000 HC EMOTIONAL WELLNESS R&B

## 2024-09-27 RX ORDER — MAGNESIUM HYDROXIDE/ALUMINUM HYDROXICE/SIMETHICONE 120; 1200; 1200 MG/30ML; MG/30ML; MG/30ML
30 SUSPENSION ORAL PRN
Status: DISCONTINUED | OUTPATIENT
Start: 2024-09-27 | End: 2024-09-30 | Stop reason: HOSPADM

## 2024-09-27 RX ORDER — RIMEGEPANT SULFATE 75 MG/75MG
75 TABLET, ORALLY DISINTEGRATING ORAL DAILY PRN
COMMUNITY

## 2024-09-27 RX ORDER — NICOTINE 21 MG/24HR
1 PATCH, TRANSDERMAL 24 HOURS TRANSDERMAL DAILY
Status: DISCONTINUED | OUTPATIENT
Start: 2024-09-27 | End: 2024-09-27

## 2024-09-27 RX ORDER — HALOPERIDOL 5 MG/ML
5 INJECTION INTRAMUSCULAR EVERY 6 HOURS PRN
Status: DISCONTINUED | OUTPATIENT
Start: 2024-09-27 | End: 2024-09-30 | Stop reason: HOSPADM

## 2024-09-27 RX ORDER — CIPROFLOXACIN 500 MG/1
500 TABLET, FILM COATED ORAL 2 TIMES DAILY
Status: DISCONTINUED | OUTPATIENT
Start: 2024-09-27 | End: 2024-09-30 | Stop reason: HOSPADM

## 2024-09-27 RX ORDER — ACETAMINOPHEN 325 MG/1
650 TABLET ORAL EVERY 6 HOURS PRN
Status: DISCONTINUED | OUTPATIENT
Start: 2024-09-27 | End: 2024-09-30 | Stop reason: HOSPADM

## 2024-09-27 RX ORDER — HYDROXYZINE PAMOATE 50 MG/1
50 CAPSULE ORAL 3 TIMES DAILY PRN
Status: DISCONTINUED | OUTPATIENT
Start: 2024-09-27 | End: 2024-09-30 | Stop reason: HOSPADM

## 2024-09-27 RX ORDER — POLYETHYLENE GLYCOL 3350 17 G
2 POWDER IN PACKET (EA) ORAL
Status: DISCONTINUED | OUTPATIENT
Start: 2024-09-27 | End: 2024-09-30 | Stop reason: HOSPADM

## 2024-09-27 RX ORDER — HALOPERIDOL 5 MG/1
5 TABLET ORAL EVERY 6 HOURS PRN
Status: DISCONTINUED | OUTPATIENT
Start: 2024-09-27 | End: 2024-09-30 | Stop reason: HOSPADM

## 2024-09-27 RX ORDER — ONDANSETRON 4 MG/1
4 TABLET, ORALLY DISINTEGRATING ORAL EVERY 8 HOURS PRN
Status: DISCONTINUED | OUTPATIENT
Start: 2024-09-27 | End: 2024-09-30 | Stop reason: HOSPADM

## 2024-09-27 RX ORDER — LANOLIN ALCOHOL/MO/W.PET/CERES
3 CREAM (GRAM) TOPICAL NIGHTLY PRN
Status: DISCONTINUED | OUTPATIENT
Start: 2024-09-27 | End: 2024-09-30 | Stop reason: HOSPADM

## 2024-09-27 RX ADMIN — Medication 3 MG: at 21:59

## 2024-09-27 RX ADMIN — Medication 3 MG: at 03:06

## 2024-09-27 RX ADMIN — ONDANSETRON 4 MG: 4 TABLET, ORALLY DISINTEGRATING ORAL at 22:45

## 2024-09-27 RX ADMIN — ONDANSETRON 4 MG: 4 TABLET, ORALLY DISINTEGRATING ORAL at 14:33

## 2024-09-27 RX ADMIN — CIPROFLOXACIN 500 MG: 500 TABLET, FILM COATED ORAL at 15:20

## 2024-09-27 RX ADMIN — HYDROXYZINE PAMOATE 50 MG: 50 CAPSULE ORAL at 21:59

## 2024-09-27 RX ADMIN — CIPROFLOXACIN 500 MG: 500 TABLET, FILM COATED ORAL at 21:58

## 2024-09-27 RX ADMIN — HYDROXYZINE PAMOATE 50 MG: 50 CAPSULE ORAL at 03:06

## 2024-09-27 ASSESSMENT — PATIENT HEALTH QUESTIONNAIRE - PHQ9
2. FEELING DOWN, DEPRESSED OR HOPELESS: MORE THAN HALF THE DAYS
SUM OF ALL RESPONSES TO PHQ QUESTIONS 1-9: 1
SUM OF ALL RESPONSES TO PHQ QUESTIONS 1-9: 1
SUM OF ALL RESPONSES TO PHQ9 QUESTIONS 1 & 2: 1
SUM OF ALL RESPONSES TO PHQ QUESTIONS 1-9: 1
SUM OF ALL RESPONSES TO PHQ QUESTIONS 1-9: 1
1. LITTLE INTEREST OR PLEASURE IN DOING THINGS: NOT AT ALL
SUM OF ALL RESPONSES TO PHQ9 QUESTIONS 1 & 2: 2
1. LITTLE INTEREST OR PLEASURE IN DOING THINGS: NOT AT ALL
2. FEELING DOWN, DEPRESSED OR HOPELESS: SEVERAL DAYS
SUM OF ALL RESPONSES TO PHQ QUESTIONS 1-9: 2

## 2024-09-27 ASSESSMENT — SLEEP AND FATIGUE QUESTIONNAIRES
SLEEP PATTERN: DISTURBED/INTERRUPTED SLEEP
SLEEP PATTERN: DISTURBED/INTERRUPTED SLEEP
DO YOU USE A SLEEP AID: YES
DO YOU HAVE DIFFICULTY SLEEPING: YES
DO YOU USE A SLEEP AID: NO
AVERAGE NUMBER OF SLEEP HOURS: 6
DO YOU HAVE DIFFICULTY SLEEPING: YES

## 2024-09-27 ASSESSMENT — LIFESTYLE VARIABLES
HOW OFTEN DO YOU HAVE A DRINK CONTAINING ALCOHOL: MONTHLY OR LESS
HOW MANY STANDARD DRINKS CONTAINING ALCOHOL DO YOU HAVE ON A TYPICAL DAY: PATIENT DOES NOT DRINK
HOW MANY STANDARD DRINKS CONTAINING ALCOHOL DO YOU HAVE ON A TYPICAL DAY: PATIENT DOES NOT DRINK
HOW OFTEN DO YOU HAVE A DRINK CONTAINING ALCOHOL: NEVER

## 2024-09-27 ASSESSMENT — PAIN SCALES - GENERAL
PAINLEVEL_OUTOF10: 0
PAINLEVEL_OUTOF10: 0

## 2024-09-27 NOTE — GROUP NOTE
Group Therapy Note    Date: 9/27/2024    Group Start Time: 0955  Group End Time: 1020  Group Topic: Psychoeducation    [facility]    Lisa Vogt, CTRS    Date: 9/27/2024  Type of Group: Psychoeducation    Wellness Binder Information  Module Name:  Coping Skills     Patient's Goal:  Pt will be able to id skills to help manage their stressors.     Notes:  Pleasant and engaged in group ,sharing when prompted.     Status After Intervention:  Improved    Participation Level: Active Listener and Interactive    Participation Quality: Appropriate, Attentive, and Sharing      Speech:  normal      Thought Process/Content: Logical      Affective Functioning: Congruent      Mood: euthymic      Level of consciousness:  Alert, Oriented x4, and Attentive      Response to Learning: Able to verbalize/acknowledge new learning, Able to retain information, and Progressing to goal      Endings: None Reported    Modes of Intervention: Education, Support, Socialization, and Clarifying      Discipline Responsible: Psychoeducational Specialist      Signature:  Lisa Vogt, CTRS

## 2024-09-27 NOTE — PROGRESS NOTES
Patient admitted to the unit from ED. 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 Patient is calm and cooperative upon admission, patient verbalizes increased anxiety at a 8/10 and depression at a 8/10. Patient states she is currently undergoing treatment for depression and ADHD. Patient's affect is flat in nature, verbalizing generalized anxiety. Patient currently denies SI/HI/Hallucinations. Patient oriented to the unit and consents signed. Q15 check initiated.

## 2024-09-27 NOTE — ED PROVIDER NOTES
fiancé  Comorbidities include Personna disorder, ADHD, migraine, thyroid disease  Differential diagnosis includes was not limited to SI, HI, depression, anxiety, electrolyte abnormality  Social determinants of health include stress         ED Course as of 09/26/24 2241   Thu Sep 26, 2024   2030 EKG:  This EKG is signed and interpreted by me.    Rate: 99  Rhythm: Sinus  Interpretation: non-specific EKG, no ST elevation, normal axis, QTc 433  Comparison: changes compared to previous EKG   [SS]      ED Course User Index  [SS] Rosa M Merritt MD       SEP-1 CORE MEASURE DATA      Sepsis Criteria   Severe Sepsis Criteria   Septic Shock Criteria     Must be confirmed or suspected to move forward with diagnosis of sepsis.    Must meet 2:    [] Temperature > 100.9 F (38.3 C)        or < 96.8 F (36 C)  [] HR > 90  [] RR > 20  [] WBC > 12 or < 4 or 10% bands    AND:    [] Infection Confirmed or        Suspected.    OR:    [x] Exclude from SEP-1 because:    [x] No infection present or suspected  [] Does not have 2+ SIRS criteria but may have an incidental infection that requires treatment  [] May have sepsis, but does not meet criteria for severe sepsis or septic shock  [] Alternative explanation for abnormal labs and/or vitals (see MDM)  [] Viral etiology found or highly suspected (including COVID-19) without concomitant bacterial infection   Must meet 1:    [] Lactate > 2       or   [] Signs of Organ Dysfunction:    - SBP < 90 or MAP < 65  - Altered mental status  - Creatinine > 2 or increased from      baseline  - Urine Output < 0.5 ml/kg/hr  - Bilirubin > 2  - INR > 1.5 (not anticoagulated)  - Platelets < 100,000  - Acute Respiratory Failure as     evidenced by new need for NIPPV     or mechanical ventilation        [] No criteria met for Severe Sepsis.   Must meet 1:    [] Lactate > 4        or   [] SBP < 90 or MAP < 65 for at        least two readings in the first        hour after fluid bolus

## 2024-09-27 NOTE — ED NOTES
Spoke to the patient regarding the phone call that she made to a suicide hotline.  She stated she text messaged the hotline.  Patient given her phone to show this writer the conversation via text.  The patient stated in the text that when she was having suicidal thoughts with no plan.  The person on the underlying text back and forth with her to make sure that she was okay and she denied having a plan or intent.  She also stated that her safety plan was to get her boyfriend over the house which she had done already.  The text messaging suicide company then stated she should follow up with her outpatient provider.      Spoke to Officer Jovanny who was on the scene and pulled over the pt's bf.  She stated to the police that she did not want to be alive and denied a method.  She stated that she definitely did not want to be alive anymore.  The officer stated that he talked to her and she said this has been going on for sometime and stated that she was going to quit her job and she called her bf.  He stated that he did not feel comfortable with her not coming to the hospital because she was very calm.  He stated that even though she was saying that all these difficulties were happening to her and that she was grieving and her boyfriend earlier she remained very calm as she was stating that \"I do not to be alive anymore \".       Spoke to the emergency room doctor regarding patient and it was agreed upon that the patient should be admitted for inpatient psychiatric stabilization.

## 2024-09-27 NOTE — PROGRESS NOTES
4 Eyes Skin Assessment     NAME:  Pooja Berger  YOB: 1995  MEDICAL RECORD NUMBER:  [id number]    The patient is being assessed for  Admission    I agree that at least one RN has performed a thorough Head to Toe Skin Assessment on the patient. ALL assessment sites listed below have been assessed.      Areas assessed by both nurses:    Head, Face, Ears, Shoulders, Back, Chest, Arms, Elbows, Hands, Sacrum. Buttock, Coccyx, Ischium, and Legs. Feet and Heels        Does the Patient have a Wound? No noted wound(s)       [place] Prevention initiated by RN: Yes  Wound Care Orders initiated by RN: No    Pressure Injury (Stage 3,4, Unstageable, DTI, NWPT, and Complex wounds) if present, place Wound referral order by RN under : No    New Ostomies, if present place, Ostomy referral order under : No     Nurse 1 eSignature: Electronically signed by Toni Morales RN on 9/27/24 at 3:33 AM EDT    **SHARE this note so that the co-signing nurse can place an eSignature**    Nurse 2 eSignature: Electronically signed by Cory Fofana RN on 9/27/24 at 3:34 AM EDT

## 2024-09-27 NOTE — ED NOTES
09/26/24  Pooja Berger  [id number]  1995    Social Work Behavioral Health Crisis Assessment    Chief Complaint: Pt contacted suicide hotline due to thoughts of not wanting to live anymore.    Mental Status Exam:  Level of consciousness:  within normal limits, Mild dysattention (reduced clarity of awareness with impaired ability to focus, sustain, or shift attention), and awake   Appearance:  well-appearing, hospital attire, seated in bed, good grooming, and good hygiene.  Does appear stated age. No acute distress.  Behavior/Motor:  no abnormalities noted  Attitude toward examiner:  cooperative, attentive, good eye contact, and withdrawn  SI/HI: SI with no plan/ no HI  Speech:  normal rate, normal volume, and well articulated , Tone: normal tone  Mood: sad and within normal limits  Affect: blunted and flat  Thought Processes:  linear, goal directed, and coherent.   Thought Content: Suicidal Ideation:  \"I DONT WANT TO BE ALIVE ANYMORE\"  Hallucinations:  Hallucinations: Denies AVOT-H  Cognition:  oriented to person, place, and time   Concentration: intact  Memory: intact, though not formally tested.  Insight: POOR  Judgement: POOR  Fund of Knowledge: excellent    Legal Status:  [] Voluntary:  [x] Involuntary, Issued by:  [] Probate    Brief Clinical Summary: Patient is a 29 y.o. White (non-) female who presents for suicidal ideation. Patient presented to the ED via EMS on 09/26/24 from home. 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 Pt texted the national suicide hotline and admitted to feeling like she wants to commit suicide and called her boyfriend. Boyfriend rushed home from work and while en route was pulled over by police for speeding. Boyfriend reported to police that his girlfriend was suicidal and they followed him home. After speaking with her, police advised pt to seek help at the ED and was transported via ambulance.    Pt reports a history of MH treatment at

## 2024-09-27 NOTE — GROUP NOTE
Shared goal for the day as to officially resign at work.                                                                      Group Therapy Note    Date: 9/27/2024    Group Start Time: 0930  Group End Time: 0955  Group Topic: Community Meeting    [facility]    Lisa Vogt CTRS        Group Therapy Note      Type of Group: Community Meeting      Patient's Goal:  Patient will be able to id staffing assignments, expectations of patients, and general information re: floor rules. Will be prompted to share goal for the day.     Notes:  Patient appeared to be an active listener, taking in information presented and was prompted to share goal for the day.    Status After Intervention:  Improved    Participation Level: Active Listener and Interactive    Participation Quality: Appropriate, Attentive, and Sharing      Speech:  normal      Thought Process/Content: Logical      Affective Functioning: Congruent      Mood: euthymic      Level of consciousness:  Alert, Oriented x4, and Attentive      Response to Learning: Able to verbalize/acknowledge new learning and Able to retain information      Endings: None Reported    Modes of Intervention: Education, Support, and Clarifying      Discipline Responsible: Psychoeducational Specialist      Signature:  RADHA Dudley

## 2024-09-27 NOTE — PROGRESS NOTES
[facility]  Admission Note     Admission Type:   Admission Type: Involuntary    Reason for admission:  Reason for Admission: \"My job has stressed me out to the point of feeling like I was going to kill myself.\"      Addictive Behavior:   Addictive Behavior  In the Past 3 Months, Have You Felt or Has Someone Told You That You Have a Problem With  : None    Medical Problems:   Past Medical History:   Diagnosis Date    ADHD (attention deficit hyperactivity disorder) 2016    Depression     Headache        Status EXAM:  Mental Status and Behavioral Exam  Normal: No  Level of Assistance: Independent/Self  Facial Expression: Sad  Affect: Appropriate  Level of Consciousness: Alert  Frequency of Checks: 4 times per hour, close  Mood:Normal: No  Mood: Depressed, Sad  Motor Activity:Normal: Yes  Eye Contact: Fair  Observed Behavior: Friendly, Cooperative  Sexual Misconduct History: Current - no  Preception: [place] to person, [place] to time, [place] to place, [place] to situation  Attention:Normal: Yes  Thought Processes: Unremarkable  Thought Content:Normal: Yes  Depression Symptoms: Feelings of helplessness  Anxiety Symptoms: Generalized  Cherry Symptoms: No problems reported or observed.  Hallucinations: None  Delusions: No  Memory:Normal: Yes  Insight and Judgment: No  Insight and Judgment: Poor judgment, Poor insight    Tobacco Screening:  Practical Counseling, on admission, nura X, if applicable and completed (first 3 are required if patient doesn't refuse)X:            ( ) Recognizing danger situations (included triggers and roadblocks)                    ( ) Coping skills (new ways to manage stress,relaxation techniques, changing routine, distraction)                                                           ( ) Basic information about quitting (benefits of quitting, techniques in how to quit, available resources  ( ) [facility]

## 2024-09-27 NOTE — H&P
Department of Psychiatry  History and Physical - Adult     CHIEF COMPLAINT:    Chief Complaint   Patient presents with    Psychiatric Evaluation     Pt was at work and became anxious/ depressed hd thoughts of si then called suicide hotline. Denies plan denies hi denies hallucination. Treats with psych care current with meds.       Patient was seen after discussing with the treatment team and reviewing the chart    CIRCUMSTANCES OF ADMISSION:     Patient name: Pooja Berger  Patient's past mental health and addiction history: Depression and ADHD  Patient's presentation to the ED and why the patient needs admission: Texted suicidal statements the suicide hotline  Legal status:  []  Voluntary  [x]  Involuntary  []  Probate  Triggering/precipitating events: Feeling as though work is stressing her out also had a recent medication change  Duration of triggering/precipitating events: Just prior to arrival    HISTORY OF PRESENT ILLNESS:      The patient is a 29 y.o. female with significant past history of depression and ADHD brought in to the ED brought in by EMS after texting suicide hotline suicidal statements.  Feeling overwhelmed with anxiety and wanting to die.  In the ED her urine drug screen was negative, her blood alcohol level is negative, she was medically cleared but to [facility]. adult psychiatric unit for further psychiatric assessment stabilization and treatment      Upon evaluation today patient is pleasant and cooperative and agrees to speak with us.  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  She became frustrated and upset and end up texting the suicide hotline.  She states that she had also let her fiancé

## 2024-09-28 LAB
CHOLEST SERPL-MCNC: 104 MG/DL
HBA1C MFR BLD: 5.1 % (ref 4–5.6)
HDLC SERPL-MCNC: 48 MG/DL
LDLC SERPL CALC-MCNC: 49 MG/DL
TRIGL SERPL-MCNC: 36 MG/DL
VLDLC SERPL CALC-MCNC: 7 MG/DL

## 2024-09-28 PROCEDURE — 1240000000 HC EMOTIONAL WELLNESS R&B

## 2024-09-28 PROCEDURE — 80061 LIPID PANEL: CPT

## 2024-09-28 PROCEDURE — 36415 COLL VENOUS BLD VENIPUNCTURE: CPT

## 2024-09-28 PROCEDURE — 6370000000 HC RX 637 (ALT 250 FOR IP): Performed by: PSYCHIATRY & NEUROLOGY

## 2024-09-28 PROCEDURE — 99232 SBSQ HOSP IP/OBS MODERATE 35: CPT

## 2024-09-28 PROCEDURE — 83036 HEMOGLOBIN GLYCOSYLATED A1C: CPT

## 2024-09-28 PROCEDURE — 6370000000 HC RX 637 (ALT 250 FOR IP): Performed by: NURSE PRACTITIONER

## 2024-09-28 RX ADMIN — Medication 3 MG: at 21:37

## 2024-09-28 RX ADMIN — HYDROXYZINE PAMOATE 50 MG: 50 CAPSULE ORAL at 22:34

## 2024-09-28 RX ADMIN — CIPROFLOXACIN 500 MG: 500 TABLET, FILM COATED ORAL at 09:10

## 2024-09-28 RX ADMIN — HYDROXYZINE PAMOATE 50 MG: 50 CAPSULE ORAL at 13:37

## 2024-09-28 RX ADMIN — CIPROFLOXACIN 500 MG: 500 TABLET, FILM COATED ORAL at 21:37

## 2024-09-28 RX ADMIN — ONDANSETRON 4 MG: 4 TABLET, ORALLY DISINTEGRATING ORAL at 22:34

## 2024-09-28 RX ADMIN — ONDANSETRON 4 MG: 4 TABLET, ORALLY DISINTEGRATING ORAL at 06:57

## 2024-09-28 RX ADMIN — ACETAMINOPHEN 650 MG: 325 TABLET ORAL at 10:17

## 2024-09-28 RX ADMIN — VORTIOXETINE 5 MG: 10 TABLET, FILM COATED ORAL at 09:10

## 2024-09-28 ASSESSMENT — PAIN SCALES - GENERAL
PAINLEVEL_OUTOF10: 0
PAINLEVEL_OUTOF10: 0
PAINLEVEL_OUTOF10: 4

## 2024-09-28 ASSESSMENT — PAIN DESCRIPTION - DESCRIPTORS: DESCRIPTORS: ACHING

## 2024-09-28 ASSESSMENT — PAIN - FUNCTIONAL ASSESSMENT: PAIN_FUNCTIONAL_ASSESSMENT: ACTIVITIES ARE NOT PREVENTED

## 2024-09-28 ASSESSMENT — PAIN DESCRIPTION - LOCATION: LOCATION: HEAD

## 2024-09-28 NOTE — BH NOTE
Pt denies suicidal  / homicidal.  Pt denies hallucinations.   Pt is without immediate behavioral concerns at this time.   Pt was medicated earlier for a headache that was minor in nature at the time. Pt is without other distress presently.   Pt is aware of the food / drink policy for unit.

## 2024-09-28 NOTE — GROUP NOTE
Group Therapy Note    Date: 9/28/2024    Group Start Time: 0945  Group End Time: 1020  Group Topic: Psychoeducation    Ericka Ramye CTRS    Group Therapy Note    Attendees: 15    Date: 9/28/2024  Start Time: 0945  End Time:  1020  Number of Participants: 15    Type of Group: Psychoeducation    Name:  Stages of Change/Processing Change    Patient's Goal:  Increased awareness of stages of change. Identified healthy ways to process changes.    Notes:  CTRS led educational group discussion on change. Encouraged patients to share their experiences. Patient was actively engaged in group discussion and made positive responses.    Status After Intervention:  Improved    Participation Level: Active Listener and Interactive    Participation Quality: Appropriate, Attentive, and Sharing      Speech:  normal      Thought Process/Content: Logical  Linear      Affective Functioning: Congruent      Mood:  Appropriate      Level of consciousness:  Alert and Attentive      Response to Learning: Able to verbalize current knowledge/experience, Able to verbalize/acknowledge new learning, Able to retain information, Capable of insight, Able to change behavior, and Progressing to goal      Endings: None Reported    Modes of Intervention: Education, Support, Socialization, Exploration, Clarifying, and Problem-solving      Discipline Responsible: Psychoeducational Specialist      Signature:  RADHA Lowe

## 2024-09-28 NOTE — GROUP NOTE
Group Therapy Note    Date: 9/28/2024    Group Start Time: 0930  Group End Time: 0945  Group Topic: Community Meeting    Ericka Ramey CTRS    Group Therapy Note    Attendees: 14    Date: 9/28/2024  Start Time: 0930  End Time:  0945  Number of Participants: 14    Type of Group: Community Meeting    Patient's Goal:  Increased awareness of functions of unit, staffing and programming for the day.     Notes:  Patient was an active listener in group. Patient shared goal for the day as, \"Try not to blame myself for things out of my control.\"    Status After Intervention:  Improved    Participation Level: Active Listener and Interactive    Participation Quality: Appropriate, Attentive, and Sharing      Speech:  normal      Thought Process/Content: Logical  Linear      Affective Functioning: Congruent      Mood:  Appropriate      Level of consciousness:  Alert and Attentive      Response to Learning: Able to verbalize current knowledge/experience, Able to verbalize/acknowledge new learning, Able to retain information, Capable of insight, Able to change behavior, and Progressing to goal      Endings: None Reported    Modes of Intervention: Education, Support, Socialization, Exploration, Clarifying, and Problem-solving      Discipline Responsible: Psychoeducational Specialist      Signature:  RADHA Lowe

## 2024-09-28 NOTE — GROUP NOTE
Group Therapy Note    Date: 9/28/2024    Group Start Time: 1050  Group End Time: 1130  Group Topic: Cognitive Skills    [facility]    Kathleen Bowie LSW        Group Therapy Note    Attendees: 14       Patient's Goal:  Pts discussed thinking, feeling and coping in challenging situations.     Notes:  Pt actively participated in group.      Status After Intervention:  Improved    Participation Level: Active Listener    Participation Quality: Appropriate      Speech:  normal      Thought Process/Content: Logical      Affective Functioning: Congruent      Mood: euthymic      Level of consciousness:  Alert      Response to Learning: Progressing to goal      Endings: None Reported    Modes of Intervention: Education, Support, Socialization, Exploration, Clarifying, and Problem-solving      Discipline Responsible: /Counselor      Signature:  CASI Obrien

## 2024-09-28 NOTE — PROGRESS NOTES
BEHAVIORAL HEALTH FOLLOW-UP NOTE     9/28/2024     Patient was seen and examined in person, Chart reviewed   Patient's case discussed with staff/team    Chief Complaint: Suicidal ideation    Interim History:   Patient was seen sitting coloring a picture out on the unit she appears fairly milligrams she currently calm pleasant and cooperative.  She states that she is no longer feeling as overwhelmed to that she does not feel anxious.  She states that she was just having issues dealing with situations in her life and reports that she became frustrated and ended up here.  She states that she is hopeful she would not have to stay here long.  She denies suicidal ideation, denies homicidal ideation she denied auditory and visual hallucinations.  She has been taking her medications, she has had no behavioral outbursts on the unit, she is social and going to group.  Sleep and appetite reported to be fair    Appetite: [x] Normal/Unchanged  [] Increased  [] Decreased      Sleep:       [x] Normal/Unchanged  [] Fair       [] Poor              Energy:    [x] Normal/Unchanged  [] Increased  [] Decreased        SI [] Present  [x] Absent    HI  []Present  [x] Absent     Aggression:  [] yes  [x] no    Patient is [x] able  [] unable to CONTRACT FOR SAFETY     PAST MEDICAL/PSYCHIATRIC HISTORY:   Past Medical History:   Diagnosis Date    ADHD (attention deficit hyperactivity disorder) 2016    Depression     Headache        FAMILY/SOCIAL HISTORY:  Family History   Problem Relation Age of Onset    Miscarriages / Stillbirths Mother     High Blood Pressure Father     Arthritis Father     High Cholesterol Father     Coronary Art Dis Father     Heart Attack Father     No Known Problems Sister     No Known Problems Brother      Social History     Socioeconomic History    Marital status:      Spouse name: Not on file    Number of children: Not on file    Years of education: Not on file    Highest education level: Not on file

## 2024-09-29 PROCEDURE — 6370000000 HC RX 637 (ALT 250 FOR IP): Performed by: PSYCHIATRY & NEUROLOGY

## 2024-09-29 PROCEDURE — 6370000000 HC RX 637 (ALT 250 FOR IP): Performed by: NURSE PRACTITIONER

## 2024-09-29 PROCEDURE — 99232 SBSQ HOSP IP/OBS MODERATE 35: CPT

## 2024-09-29 PROCEDURE — 1240000000 HC EMOTIONAL WELLNESS R&B

## 2024-09-29 RX ADMIN — NICOTINE POLACRILEX 2 MG: 2 LOZENGE ORAL at 18:19

## 2024-09-29 RX ADMIN — NICOTINE POLACRILEX 2 MG: 2 LOZENGE ORAL at 21:47

## 2024-09-29 RX ADMIN — ONDANSETRON 4 MG: 4 TABLET, ORALLY DISINTEGRATING ORAL at 21:40

## 2024-09-29 RX ADMIN — Medication 3 MG: at 21:40

## 2024-09-29 RX ADMIN — ONDANSETRON 4 MG: 4 TABLET, ORALLY DISINTEGRATING ORAL at 08:39

## 2024-09-29 RX ADMIN — VORTIOXETINE 5 MG: 10 TABLET, FILM COATED ORAL at 08:38

## 2024-09-29 RX ADMIN — CIPROFLOXACIN 500 MG: 500 TABLET, FILM COATED ORAL at 08:39

## 2024-09-29 RX ADMIN — CIPROFLOXACIN 500 MG: 500 TABLET, FILM COATED ORAL at 21:39

## 2024-09-29 ASSESSMENT — PAIN SCALES - GENERAL
PAINLEVEL_OUTOF10: 0
PAINLEVEL_OUTOF10: 0

## 2024-09-29 NOTE — BH NOTE
Spoke with pt. Pt. denies suicidal, homicidal, hallucinations. Pt. Is cooperative and attending group. Negative behavioral concerns at this time.

## 2024-09-29 NOTE — BH NOTE
This RN has sat down with pt to discuss stressors with work and life. RN offered understanding, advice, and empathy regarding her concerns.   Meeting well and questions were addressed as asked.   Pt is without distress at this time, pt is cooperative and pleasant in demeanor.

## 2024-09-29 NOTE — PROGRESS NOTES
Excellent particip during this afterN's 90 min group on pers types.    Scored 100 on both quiz activities and vol to do a 3d quiz.    Supportive of peers.   Excellent contrib during Q and A.   Appeared to fully digest key learning points.     Earned particip prize

## 2024-09-29 NOTE — GROUP NOTE
Group Therapy Note    Date: 9/29/2024    Group Start Time: 1100  Group End Time: 1145  Group Topic: Cognitive Skills    [facility]    Kathleen Bowie LSW        Group Therapy Note    Attendees: 11       Patient's Goal:    Pts discussed how music is beneficial to self care and requested a song to be played.    Notes:  Pt actively participated in group.     Status After Intervention:  Improved    Participation Level: Active Listener    Participation Quality: Appropriate      Speech:  normal      Thought Process/Content: Logical      Affective Functioning: Congruent      Mood: euthymic      Level of consciousness:  Alert      Response to Learning: Progressing to goal      Endings: None Reported    Modes of Intervention: Education, Support, Socialization, Exploration, Clarifying, and Problem-solving      Discipline Responsible: /Counselor      Signature:  CASI Obrien

## 2024-09-29 NOTE — BH NOTE
[facility]  Day 3 Interdisciplinary Treatment Plan NOTE    Review Date & Time:  9/29/24   1000 am    Admission Type:   Admission Type: Involuntary    Reason for admission:  Reason for Admission: \"My job has stressed me out to the point of feeling like I was going to kill myself.\"  Estimated Length of Stay: 5-7 days  Estimated Discharge Date Update: to be determined by physician    PATIENT STRENGTHS:  Patient Strengths    Patient Strengths and Limitations:Limitations: Multiple barriers to leisure interests, Lacks leisure interests  Addictive Behavior:Addictive Behavior  In the Past 3 Months, Have You Felt or Has Someone Told You That You Have a Problem With  : None  Medical Problems:  Past Medical History:   Diagnosis Date    ADHD (attention deficit hyperactivity disorder) 2016    Depression     Headache        Risk:  Fall Risk   Clinton Scale Clinton Scale Score: 22  BVC    Change in scores no Changes to plan of Care no    Status EXAM:   Mental Status and Behavioral Exam  Normal: Yes  Level of Assistance: Independent/Self  Facial Expression: Brightened  Affect: Congruent, Appropriate  Level of Consciousness: Alert  Frequency of Checks: 4 times per hour, close  Mood:Normal: No  Mood: Anxious  Motor Activity:Normal: Yes  Motor Activity: Other (comment) (WDL)  Eye Contact: Good  Observed Behavior: Friendly, Cooperative  Sexual Misconduct History: Current - no  Preception: [place] to person, [place] to time, [place] to place, [place] to situation  Attention:Normal: Yes  Thought Processes: Unremarkable  Thought Content:Normal: Yes  Depression Symptoms: No problems reported or observed.  Anxiety Symptoms: Generalized  Cherry Symptoms: No problems reported or observed.  Hallucinations: None  Delusions: No  Memory:Normal: Yes  Memory: Other (comment) (INTACT)  Insight and Judgment: Yes  Insight and Judgment: Other (comment) (IMPAIRED)    Daily Assessment Last Entry:   Daily Sleep (WDL): Within Defined Limits

## 2024-09-29 NOTE — GROUP NOTE
Group Therapy Note    Date: 9/29/2024    Group Start Time: 0930  Group End Time: 0945  Group Topic: Community Meeting    Ericka Ramey CTRS    Group Therapy Note    Attendees: 14    Date: 9/29/2024  Start Time: 0930  End Time:  0945  Number of Participants: 14    Type of Group: Community Meeting    Patient's Goal:  Increased awareness of functions of the unit, programming and staffing.    Notes:  Patient was an active listener in group. Patient shared goal for the day as, \"Keep up this good mood I'm in.\"    Status After Intervention:  Improved    Participation Level: Active Listener and Interactive    Participation Quality: Appropriate, Attentive, and Sharing      Speech:  normal      Thought Process/Content: Logical  Linear      Affective Functioning: Congruent      Mood:  Appropriate      Level of consciousness:  Alert and Attentive      Response to Learning: Able to verbalize current knowledge/experience, Able to verbalize/acknowledge new learning, Able to retain information, Capable of insight, Able to change behavior, and Progressing to goal      Endings: None Reported    Modes of Intervention: Education, Support, Socialization, Exploration, Clarifying, and Problem-solving      Discipline Responsible: Psychoeducational Specialist      Signature:  RADHA Lowe

## 2024-09-29 NOTE — PROGRESS NOTES
Patient denies suicidal ideation, homicidal ideations and AVH.  Patient denies anxiety and depression.  Patient states she is feeling \"good.\"  Patient appears slightly anxious but otherwise is brightened and friendly.  Presents calm and cooperative during assessment.  Patient is out on the unit and is social with peers.  Medications taken without issue.  No complaints or concerns verbalized at this time.  No unit problems reported.  Will continue to observe and support.

## 2024-09-29 NOTE — PROGRESS NOTES
BEHAVIORAL HEALTH FOLLOW-UP NOTE     9/29/2024     Patient was seen and examined in person, Chart reviewed   Patient's case discussed with staff/team    Chief Complaint: Suicidal ideation    Interim History:   Patient was seen in her room lying in bed just waking up she is calm, cooperative.  She states that she is feeling better she reports she fell like she is needed a \"reset\".  She states that she has been going to groups and they are helpful.  She denies suicidal ideation, denies homicidal ideation she denies auditory visual hallucinations.  She has been taking her medications, she has had no behavioral operas on the unit, she is social and going to group.  Sleep and appetite reported to be fair    Appetite: [x] Normal/Unchanged  [] Increased  [] Decreased      Sleep:       [x] Normal/Unchanged  [] Fair       [] Poor              Energy:    [x] Normal/Unchanged  [] Increased  [] Decreased        SI [] Present  [x] Absent    HI  []Present  [x] Absent     Aggression:  [] yes  [x] no    Patient is [x] able  [] unable to CONTRACT FOR SAFETY     PAST MEDICAL/PSYCHIATRIC HISTORY:   Past Medical History:   Diagnosis Date    ADHD (attention deficit hyperactivity disorder) 2016    Depression     Headache        FAMILY/SOCIAL HISTORY:  Family History   Problem Relation Age of Onset    Miscarriages / Stillbirths Mother     High Blood Pressure Father     Arthritis Father     High Cholesterol Father     Coronary Art Dis Father     Heart Attack Father     No Known Problems Sister     No Known Problems Brother      Social History     Socioeconomic History    Marital status:      Spouse name: Not on file    Number of children: Not on file    Years of education: Not on file    Highest education level: Not on file   Occupational History    Not on file   Tobacco Use    Smoking status: Every Day     Types: E-Cigarettes    Smokeless tobacco: Never   Vaping Use    Vaping status: Every Day    Substances: Nicotine, Flavoring

## 2024-09-29 NOTE — GROUP NOTE
Group Therapy Note    Date: 9/29/2024    Group Start Time: 0945  Group End Time: 1020  Group Topic: Psychoeducation    Ericka Ramey CTRS    Group Therapy Note    Attendees: 14    Date: 9/29/2024  Start Time: 0945  End Time:  1020  Number of Participants: 14    Type of Group: Psychoeducation    Name:  Positive Thinking    Patient's Goal:  Identify what is positive thinking, types of negative thinking and ways to change thinking patterns.    Notes:  CTRS led educational group discussion on positive thinking. Encouraged patients to share their experiences. Patient was actively engaged in group discussion and made positive responses.    Status After Intervention:  Improved    Participation Level: Active Listener and Interactive    Participation Quality: Appropriate, Attentive, and Sharing      Speech:  normal      Thought Process/Content: Logical  Linear      Affective Functioning: Congruent      Mood:  Appropriate      Level of consciousness:  Alert and Attentive      Response to Learning: Able to verbalize current knowledge/experience, Able to verbalize/acknowledge new learning, Able to retain information, Capable of insight, Able to change behavior, and Progressing to goal      Endings: None Reported    Modes of Intervention: Education, Support, Socialization, Exploration, Clarifying, and Problem-solving      Discipline Responsible: Psychoeducational Specialist      Signature:  RADHA Lowe

## 2024-09-30 VITALS
HEIGHT: 72 IN | DIASTOLIC BLOOD PRESSURE: 58 MMHG | HEART RATE: 84 BPM | OXYGEN SATURATION: 96 % | SYSTOLIC BLOOD PRESSURE: 112 MMHG | RESPIRATION RATE: 17 BRPM | TEMPERATURE: 97.3 F | BODY MASS INDEX: 21.13 KG/M2 | WEIGHT: 156 LBS

## 2024-09-30 PROCEDURE — 99239 HOSP IP/OBS DSCHRG MGMT >30: CPT | Performed by: NURSE PRACTITIONER

## 2024-09-30 PROCEDURE — 6370000000 HC RX 637 (ALT 250 FOR IP): Performed by: NURSE PRACTITIONER

## 2024-09-30 RX ORDER — POLYETHYLENE GLYCOL 3350 17 G
2 POWDER IN PACKET (EA) ORAL
Qty: 100 EACH | Refills: 3
Start: 2024-09-30

## 2024-09-30 RX ORDER — LANOLIN ALCOHOL/MO/W.PET/CERES
3 CREAM (GRAM) TOPICAL NIGHTLY PRN
COMMUNITY
Start: 2024-09-30 | End: 2024-10-30

## 2024-09-30 RX ORDER — HYDROXYZINE PAMOATE 50 MG/1
50 CAPSULE ORAL 3 TIMES DAILY PRN
Qty: 42 CAPSULE | Refills: 0 | Status: SHIPPED | OUTPATIENT
Start: 2024-09-30 | End: 2024-10-14

## 2024-09-30 RX ADMIN — NICOTINE POLACRILEX 2 MG: 2 LOZENGE ORAL at 08:48

## 2024-09-30 RX ADMIN — VORTIOXETINE 5 MG: 10 TABLET, FILM COATED ORAL at 09:47

## 2024-09-30 RX ADMIN — ONDANSETRON 4 MG: 4 TABLET, ORALLY DISINTEGRATING ORAL at 08:48

## 2024-09-30 RX ADMIN — CIPROFLOXACIN 500 MG: 500 TABLET, FILM COATED ORAL at 09:47

## 2024-09-30 NOTE — PROGRESS NOTES
Patient is awake, alert, oriented. She is pleasant and cooperative. She reports she feel like she got better over the weekend. She reports her fiance Ruiz is taking the week off of work to make sure everything goes well when the patient is discharged. She reports anxiety is 3, depression is 0. She denies SI/HI/AVH. She reports she is sleeping and eating well. She reports she feels ready to go home and would like to follow [facility] 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 Encouraged group participation pending discharge plan.

## 2024-09-30 NOTE — PROGRESS NOTES
CLINICAL PHARMACY NOTE: MEDS TO BEDS    Total # of Prescriptions Filled: 1   The following medications were delivered to the patient:  Hydroxyzine mary 50mg    Additional Documentation:  Danay LAZO picked up in pharmacy 9-30-24

## 2024-09-30 NOTE — BH NOTE
[facility]  Discharge Note    Pt discharged with followings belongings:   Dental Appliances: None  Vision - Corrective Lenses: Eyeglasses, At bedside  Hearing Aid: None  Jewelry: Watch  Body Piercings Removed: No  Clothing: Pants, Sweater, Undergarments, Footwear (P7, SW2)  Other Valuables: At home   Valuables returned to patient. Patient educated on aftercare instructions: YES  Patient verbalize understanding of AVS:  YES.    Status EXAM upon discharge:  Mental Status and Behavioral Exam  Normal: Yes  Level of Assistance: Independent/Self  Facial Expression: Brightened  Affect: Appropriate  Level of Consciousness: Alert  Frequency of Checks: 4 times per hour, close  Mood:Normal: Yes  Mood: Anxious  Motor Activity:Normal: Yes  Motor Activity: Other (comment)  Eye Contact: Good  Observed Behavior: Friendly, Cooperative  Sexual Misconduct History: Current - no  Preception: [place] to person, [place] to time, [place] to place, [place] to situation  Attention:Normal: Yes  Attention: Distractible  Thought Processes: Unremarkable  Thought Content:Normal: Yes  Depression Symptoms: No problems reported or observed.  Anxiety Symptoms: No problems reported or observed.  Cherry Symptoms: No problems reported or observed.  Hallucinations: None  Delusions: No  Memory:Normal: Yes  Memory: Poor recent, Poor remote  Insight and Judgment: Yes  Insight and Judgment: Poor insight, Poor judgment    Tobacco Screening:  Practical Counseling, on admission, nura X, if applicable and completed (first 3 are required if patient doesn't refuse)REFUSED:            ( ) Recognizing danger situations (included triggers and roadblocks)                    ( ) Coping skills (new ways to manage stress,relaxation techniques, changing routine, distraction)                                                           ( ) Basic information about quitting (benefits of quitting, techniques in how to quit, available resources  ( ) Referral for

## 2024-09-30 NOTE — PLAN OF CARE
Problem: Behavior  Goal: Pt/Family maintain appropriate behavior and adhere to behavioral management agreement, if implemented  Description: INTERVENTIONS:  1. Assess patient/family's coping skills and  non-compliant behavior (including use of illegal substances)  2. Notify security of behavior or suspected illegal substances which indicate the need for search of the family and/or belongings  3. Encourage verbalization of thoughts and concerns in a socially appropriate manner  4. Utilize positive, consistent limit setting strategies supporting safety of patient, staff and others  5. Encourage participation in the decision making process about the behavioral management agreement  6. If a visitor's behavior poses a threat to safety call refer to organization policy.  7. Initiate consult with , Psychosocial CNS, Spiritual Care as appropriate  9/29/2024 1043 by Laure Rush RN  Outcome: Progressing  9/28/2024 2116 by Karen Velazquez, RN  Outcome: Progressing     Problem: Anxiety  Goal: Will report anxiety at manageable levels  Description: INTERVENTIONS:  1. Administer medication as ordered  2. Teach and rehearse alternative coping skills  3. Provide emotional support with 1:1 interaction with staff  9/29/2024 1043 by Laure Rush, RN  Outcome: Progressing  9/28/2024 2116 by Karen Velazquez, RN  Outcome: Progressing     
  Problem: Depression  Goal: Will be euthymic at discharge  Description: INTERVENTIONS:  1. Administer medication as ordered  2. Provide emotional support via 1:1 interaction with staff  3. Encourage involvement in milieu/groups/activities  4. Monitor for social isolation  9/28/2024 1133 by Christo Judd RN  Outcome: Progressing  9/28/2024 0257 by Lala Betancourt RN  Outcome: Progressing     Problem: Behavior  Goal: Pt/Family maintain appropriate behavior and adhere to behavioral management agreement, if implemented  Description: INTERVENTIONS:  1. Assess patient/family's coping skills and  non-compliant behavior (including use of illegal substances)  2. Notify security of behavior or suspected illegal substances which indicate the need for search of the family and/or belongings  3. Encourage verbalization of thoughts and concerns in a socially appropriate manner  4. Utilize positive, consistent limit setting strategies supporting safety of patient, staff and others  5. Encourage participation in the decision making process about the behavioral management agreement  6. If a visitor's behavior poses a threat to safety call refer to organization policy.  7. Initiate consult with , Psychosocial CNS, Spiritual Care as appropriate  9/28/2024 1133 by Christo Judd, RN  Outcome: Progressing  9/28/2024 0257 by Lala Betancourt RN  Outcome: Progressing     
  Problem: Self Harm/Suicidality  Goal: Will have no self-injury during hospital stay  Description: INTERVENTIONS:  1.  Ensure constant observer at bedside with Q15M safety checks  2.  Maintain a safe environment  3.  Secure patient belongings  4.  Ensure family/visitors adhere to safety recommendations  5.  Ensure safety tray has been added to patient's diet order  6.  Every shift and PRN: Re-assess suicidal risk via Frequent Screener    9/27/2024 1118 by Paty Finch RN  Outcome: Progressing  9/27/2024 0330 by Toni Morales RN  Outcome: Progressing     Problem: Depression  Goal: Will be euthymic at discharge  Description: INTERVENTIONS:  1. Administer medication as ordered  2. Provide emotional support via 1:1 interaction with staff  3. Encourage involvement in milieu/groups/activities  4. Monitor for social isolation  9/27/2024 1118 by Paty Finch RN  Outcome: Progressing  9/27/2024 0330 by Toni Morales RN  Outcome: Progressing     Problem: Behavior  Goal: Pt/Family maintain appropriate behavior and adhere to behavioral management agreement, if implemented  Description: INTERVENTIONS:  1. Assess patient/family's coping skills and  non-compliant behavior (including use of illegal substances)  2. Notify security of behavior or suspected illegal substances which indicate the need for search of the family and/or belongings  3. Encourage verbalization of thoughts and concerns in a socially appropriate manner  4. Utilize positive, consistent limit setting strategies supporting safety of patient, staff and others  5. Encourage participation in the decision making process about the behavioral management agreement  6. If a visitor's behavior poses a threat to safety call refer to organization policy.  7. Initiate consult with , Psychosocial CNS, Spiritual Care as appropriate  9/27/2024 1118 by Paty Finch RN  Outcome: Progressing  9/27/2024 0330 by Toni Morales RN  Outcome: Progressing   
  Problem: Self Harm/Suicidality  Goal: Will have no self-injury during hospital stay  Description: INTERVENTIONS:  1.  Ensure constant observer at bedside with Q15M safety checks  2.  Maintain a safe environment  3.  Secure patient belongings  4.  Ensure family/visitors adhere to safety recommendations  5.  Ensure safety tray has been added to patient's diet order  6.  Every shift and PRN: Re-assess suicidal risk via Frequent Screener    Outcome: Progressing     Problem: Depression  Goal: Will be euthymic at discharge  Description: INTERVENTIONS:  1. Administer medication as ordered  2. Provide emotional support via 1:1 interaction with staff  3. Encourage involvement in milieu/groups/activities  4. Monitor for social isolation  Outcome: Progressing     Problem: Behavior  Goal: Pt/Family maintain appropriate behavior and adhere to behavioral management agreement, if implemented  Description: INTERVENTIONS:  1. Assess patient/family's coping skills and  non-compliant behavior (including use of illegal substances)  2. Notify security of behavior or suspected illegal substances which indicate the need for search of the family and/or belongings  3. Encourage verbalization of thoughts and concerns in a socially appropriate manner  4. Utilize positive, consistent limit setting strategies supporting safety of patient, staff and others  5. Encourage participation in the decision making process about the behavioral management agreement  6. If a visitor's behavior poses a threat to safety call refer to organization policy.  7. Initiate consult with , Psychosocial CNS, Spiritual Care as appropriate  Outcome: Progressing     Problem: Anxiety  Goal: Will report anxiety at manageable levels  Description: INTERVENTIONS:  1. Administer medication as ordered  2. Teach and rehearse alternative coping skills  3. Provide emotional support with 1:1 interaction with staff  Outcome: Progressing     
[facility]  Initial Interdisciplinary Treatment Plan NOTE    Review Date & Time:  9/27/24 1000    Patient was in treatment team    Admission Type:   Admission Type: Involuntary    Reason for admission:  Reason for Admission: \"My job has stressed me out to the point of feeling like I was going to kill myself.\"      Estimated Length of Stay Update:  5 DAYS  Estimated Discharge Date Update:  MONDAY    EDUCATION:   Learner Progress Toward Treatment Goals: Reviewed results and recommendations of this team    Method: Small group    Outcome: Needs reinforcement    PATIENT GOALS: \"OFFICIALLY RESIGN FROM WORK\"    PLAN/TREATMENT RECOMMENDATIONS UPDATE: SUICIDE PRECAUTIONS, SUPPORTIVE CARE, COLLATERAL INFORMATION, GROUPS, MEDICATION ADJUST, DISCHARGE PALNNING WITH FOLLOW UP CARE    GOALS UPDATE:   Time frame for Short-Term Goals:  5 DAYS    Paty Finch RN      
Patient is brightened and social on the unit. 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 Patient is not displaying any behavioral issues or voicing any delusions. Patient is medication compliant. Patient is attending group activities. Patient is eating meals. Patient denies any suicidal ideations/homicidal ideations/audio or visual hallucinations.       Problem: Self Harm/Suicidality  Goal: Will have no self-injury during hospital stay  Description: INTERVENTIONS:  1.  Ensure constant observer at bedside with Q15M safety checks  2.  Maintain a safe environment  3.  Secure patient belongings  4.  Ensure family/visitors adhere to safety recommendations  5.  Ensure safety tray has been added to patient's diet order  6.  Every shift and PRN: Re-assess suicidal risk via Frequent Screener    9/27/2024 2115 by Mauro Mane RN  Outcome: Progressing     Problem: Depression  Goal: Will be euthymic at discharge  Description: INTERVENTIONS:  1. Administer medication as ordered  2. Provide emotional support via 1:1 interaction with staff  3. Encourage involvement in milieu/groups/activities  4. Monitor for social isolation  9/27/2024 2115 by Mauro Mane RN  Outcome: Progressing     Problem: Behavior  Goal: Pt/Family maintain appropriate behavior and adhere to behavioral management agreement, if implemented  Description: INTERVENTIONS:  1. Assess patient/family's coping skills and  non-compliant behavior (including use of illegal substances)  2. Notify security of behavior or suspected illegal substances which indicate the need for search of the family and/or belongings  3. Encourage verbalization of thoughts and concerns in a socially appropriate manner  4. Utilize positive, consistent limit setting strategies supporting safety of patient, staff and others  5. Encourage participation in the decision making process about the behavioral management agreement  6. 
Administer medication as ordered  2. Teach and rehearse alternative coping skills  3. Provide emotional support with 1:1 interaction with staff  Outcome: Progressing     Problem: Sleep Disturbance  Goal: Will exhibit normal sleeping pattern  Description: INTERVENTIONS:  1. Administer medication as ordered  2. Decrease environmental stimuli, including noise, as appropriate  3. Discourage social isolation and naps during the day  Outcome: Progressing     Problem: Involuntary Admit  Goal: Will cooperate with staff recommendations and doctor's orders and will demonstrate appropriate behavior  Description: INTERVENTIONS:  1. Treat underlying conditions and offer medication as ordered  2. Educate regarding involuntary admission procedures and rules  3. Contain excessive/inappropriate behavior per unit and hospital policies  Outcome: Progressing     
by Brill, Cory, RN  Outcome: Progressing     Problem: Sleep Disturbance  Goal: Will exhibit normal sleeping pattern  Description: INTERVENTIONS:  1. Administer medication as ordered  2. Decrease environmental stimuli, including noise, as appropriate  3. Discourage social isolation and naps during the day  Outcome: Progressing     Problem: Involuntary Admit  Goal: Will cooperate with staff recommendations and doctor's orders and will demonstrate appropriate behavior  Description: INTERVENTIONS:  1. Treat underlying conditions and offer medication as ordered  2. Educate regarding involuntary admission procedures and rules  3. Contain excessive/inappropriate behavior per unit and hospital policies  Outcome: Progressing     Problem: Risk for Elopement  Goal: Patient will not exit the unit/facility without proper excort  Outcome: Progressing     Problem: Pain  Goal: Verbalizes/displays adequate comfort level or baseline comfort level  Outcome: Progressing     Patient up to nurses station upon approach. Patient denied SI/HI. Patient denied anxiety and depression. Patient is social with peers and remains med complaint. Patient calm and cooperative. Safety rounds done q15 minutes. Will continue to monitor.

## 2024-09-30 NOTE — GROUP NOTE
Group Therapy Note    Date: 9/30/2024    Group Start Time: 1045  Group End Time: 1135  Group Topic: Psychotherapy    [facility]    Dulce Maria Felix MSW, LSW        Group Therapy Note    Attendees: 13       Patient's Goal:  To increase social interaction and improve relationships with others.      Notes:  Pt was attentive in group and was able to identify an agenda. They were also able to verbalize relating to others within the group.     Status After Intervention:  Improved    Participation Level: Active Listener and Interactive    Participation Quality: Appropriate, Attentive, and Sharing      Speech:  normal      Thought Process/Content: Logical      Affective Functioning: Congruent      Mood: anxious and depressed      Level of consciousness:  Alert, Oriented x4, and Attentive      Response to Learning: Able to verbalize current knowledge/experience, Able to verbalize/acknowledge new learning, and Able to retain information      Endings: None Reported    Modes of Intervention: Education, Support, Socialization, Exploration, Clarifying, and Problem-solving      Discipline Responsible: /Counselor      Signature:  BRISA Briones LSW

## 2024-09-30 NOTE — DISCHARGE SUMMARY
medications all patient follow appointments    Patient is discharged home in stable condition    NOTE: This report was transcribed using voice recognition software. Every effort was made to ensure accuracy; however, inadvertent computerized transcription errors may be present.     TIME SPEND - 35 MINUTES TO COMPLETE THE EVALUATION, DISCHARGE SUMMARY, MEDICATION RECONCILIATION AND FOLLOW UP CARE     Signed:  IMANI Caro - CNP  9/30/2024  11:51 AM

## 2024-09-30 NOTE — DISCHARGE INSTRUCTIONS
Follow up for Tobacco Cessation at:    [facility]                                 Date:  Friday 10/4 at 10am              [address]    [place] [address]   (Inside [facility]    take B elevators to 7th floor)   Phone: [phone]   Fax: [phone]

## 2024-09-30 NOTE — TRANSITION OF CARE
Behavioral Health Transition Record    Patient Name: Pooja Berger  YOB: 1995   Medical Record Number: [id number]  Date of Admission: 9/26/2024  6:17 PM   Date of Discharge: 9/30/24    Attending Provider: Gary Proctor MD   Discharging Provider: Dr. Pascal  To contact this individual call [phone] and ask the  to page.  If unavailable, ask to be transferred to Behavioral Health Provider on call.  A Behavioral Health Provider will be available on call 24/7 and during holidays.    Primary Care Provider: Seamus Turner DO    No Known Allergies    Reason for Admission: Patient name: Pooja Berger  Patient's past mental health and addiction history: Depression and ADHD  Patient's presentation to the ED and why the patient needs admission: Texted suicidal statements the suicide hotline  Legal status:  []  Voluntary  [x]  Involuntary  []  Probate  Triggering/precipitating events: Feeling as though work is stressing her out also had a recent medication change  Duration of triggering/precipitating events: Just prior to arrival    Admission Diagnosis: Suicidal ideation [R45.851]  Major depressive disorder without psychotic features [F32.9]    * No surgery found *    Results for orders placed or performed during the hospital encounter of 09/26/24   Serum Drug Screen   Result Value Ref Range    Acetaminophen Level <5 (L) 10.0 - 30.0 ug/mL    Ethanol Lvl <10 <10 mg/dL    Salicylate Lvl <0.3 0.0 - 30.0 mg/dL    Toxic Tricyclic Sc,Blood NEGATIVE NEGATIVE   Urine Drug Screen   Result Value Ref Range    Amphetamine Screen, Ur NEGATIVE NEGATIVE    Barbiturate Screen, Ur NEGATIVE NEGATIVE    Benzodiazepine Screen, Urine NEGATIVE NEGATIVE    Cocaine Metabolite, Urine NEGATIVE NEGATIVE    Methadone Screen, Urine NEGATIVE NEGATIVE    Opiates, Urine NEGATIVE NEGATIVE    Phencyclidine, Urine NEGATIVE NEGATIVE    Cannabinoid Scrn, Ur NEGATIVE NEGATIVE    Oxycodone Screen, Ur NEGATIVE NEGATIVE

## 2024-09-30 NOTE — GROUP NOTE
Group Therapy Note    Date: 9/30/2024  Start Time: 0755  End Time:  0810  Number of Participants: 19    Type of Group: Community Meeting    Wellness Binder Information  Module Name:  Community Meeting      Patient's Goal:  Patient will be oriented to the unit including but not limited expectations, staff, programming schedule.  Patient will be able to ID expectations of treatment.     Notes: Patient was a participant in community meeting, and was updated on expectations of the unit, staffing, programming schedule, and acclimated to their environment.    Patient shared goal for today as \"keep up my good mood\"    Status After Intervention:  Improved    Participation Level: Active Listener and Interactive    Participation Quality: Appropriate and Attentive      Speech:  normal      Thought Process/Content: Logical      Affective Functioning: Congruent      Mood:  content      Level of consciousness:  Alert and Attentive      Response to Learning: Able to verbalize current knowledge/experience, Able to verbalize/acknowledge new learning, and Progressing to goal      Endings: None Reported    Modes of Intervention: Exploration, Clarifying, and Problem-solving      Discipline Responsible: Recreational Therapist      Signature:  RADHA Varghese
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
## 2024-11-13 ENCOUNTER — HOSPITAL ENCOUNTER (EMERGENCY)
Age: 29
Discharge: HOME | End: 2024-11-13
Attending: EMERGENCY MEDICINE

## 2024-11-13 VITALS
DIASTOLIC BLOOD PRESSURE: 83 MMHG | OXYGEN SATURATION: 100 % | HEART RATE: 77 BPM | RESPIRATION RATE: 16 BRPM | SYSTOLIC BLOOD PRESSURE: 113 MMHG | TEMPERATURE: 97.7 F

## 2024-11-13 DIAGNOSIS — G43.819 OTHER MIGRAINE WITHOUT STATUS MIGRAINOSUS, INTRACTABLE: Primary | ICD-10-CM

## 2024-11-13 PROCEDURE — 96375 TX/PRO/DX INJ NEW DRUG ADDON: CPT

## 2024-11-13 PROCEDURE — 6360000002 HC RX W HCPCS: Performed by: EMERGENCY MEDICINE

## 2024-11-13 PROCEDURE — 99284 EMERGENCY DEPT VISIT MOD MDM: CPT

## 2024-11-13 PROCEDURE — 2580000003 HC RX 258: Performed by: EMERGENCY MEDICINE

## 2024-11-13 PROCEDURE — 96374 THER/PROPH/DIAG INJ IV PUSH: CPT

## 2024-11-13 RX ORDER — PREDNISONE 20 MG/1
20 TABLET ORAL 2 TIMES DAILY
Qty: 10 TABLET | Refills: 0 | Status: SHIPPED | OUTPATIENT
Start: 2024-11-13 | End: 2024-11-18

## 2024-11-13 RX ORDER — KETOROLAC TROMETHAMINE 30 MG/ML
15 INJECTION, SOLUTION INTRAMUSCULAR; INTRAVENOUS ONCE
Status: COMPLETED | OUTPATIENT
Start: 2024-11-13 | End: 2024-11-13

## 2024-11-13 RX ORDER — METOCLOPRAMIDE HYDROCHLORIDE 5 MG/ML
10 INJECTION INTRAMUSCULAR; INTRAVENOUS ONCE
Status: COMPLETED | OUTPATIENT
Start: 2024-11-13 | End: 2024-11-13

## 2024-11-13 RX ORDER — 0.9 % SODIUM CHLORIDE 0.9 %
500 INTRAVENOUS SOLUTION INTRAVENOUS ONCE
Status: COMPLETED | OUTPATIENT
Start: 2024-11-13 | End: 2024-11-13

## 2024-11-13 RX ORDER — DIPHENHYDRAMINE HYDROCHLORIDE 50 MG/ML
25 INJECTION INTRAMUSCULAR; INTRAVENOUS ONCE
Status: COMPLETED | OUTPATIENT
Start: 2024-11-13 | End: 2024-11-13

## 2024-11-13 RX ADMIN — METOCLOPRAMIDE 10 MG: 5 INJECTION, SOLUTION INTRAMUSCULAR; INTRAVENOUS at 07:06

## 2024-11-13 RX ADMIN — KETOROLAC TROMETHAMINE 15 MG: 30 INJECTION, SOLUTION INTRAMUSCULAR at 07:07

## 2024-11-13 RX ADMIN — SODIUM CHLORIDE 500 ML: 9 INJECTION, SOLUTION INTRAVENOUS at 07:07

## 2024-11-13 RX ADMIN — DIPHENHYDRAMINE HYDROCHLORIDE 25 MG: 50 INJECTION INTRAMUSCULAR; INTRAVENOUS at 07:06

## 2024-11-13 ASSESSMENT — PAIN SCALES - GENERAL: PAINLEVEL_OUTOF10: 8

## 2024-11-13 NOTE — ED PROVIDER NOTES
HPI:  11/13/24, Time: 7:04 AM BELÉN Berger is a 29 y.o. female presenting to the ED for headache describes it as throbbing's anterior and left posterior occiput onset several hours ago.  Woke her up from sleep.  Patient that she took Nurtec and Zofran and had emesis right after.  This is typical for her migraine headaches.  No fevers or chills no close contacts ill no CO exposures.  She has no neck pain.  No neurological complaints or deficits., beginning 2 hours ago.  The complaint has been persistent, mild in severity, and worsened by light and noises.  Headache was sudden onset.  Not thunderclap not pinpoint or sharp.  States it is throbbing consistent with previous migraines.    Review of Systems:   A complete review of systems was performed and pertinent positives and negatives are stated within HPI, all other systems reviewed and are negative.    --------------------------------------------- PAST HISTORY ---------------------------------------------  Past Medical History:  has a past medical history of ADHD (attention deficit hyperactivity disorder), Depression, and Headache.    Past Surgical History:  has a past surgical history that includes [place] tooth extraction and Dilation and curettage of uterus (N/A, 10/27/2019).    Social History:  reports that she has been smoking e-cigarettes. She has never used smokeless tobacco. She reports current alcohol use of about 1.0 standard drink of alcohol per week. She reports that she does not use drugs.    Family History: family history includes Arthritis in her father; Coronary Art Dis in her father; Heart Attack in her father; High Blood Pressure in her father; High Cholesterol in her father; Miscarriages / Stillbirths in her mother; No Known Problems in her brother and sister.     The patient’s home medications have been reviewed.    Allergies: Patient has no known allergies.    -------------------------------------------------- RESULTS

## 2024-11-18 ENCOUNTER — APPOINTMENT (OUTPATIENT)
Dept: GENERAL RADIOLOGY | Age: 29
End: 2024-11-18

## 2024-11-18 ENCOUNTER — HOSPITAL ENCOUNTER (EMERGENCY)
Age: 29
Discharge: HOME | End: 2024-11-18
Attending: EMERGENCY MEDICINE

## 2024-11-18 VITALS
HEART RATE: 103 BPM | DIASTOLIC BLOOD PRESSURE: 92 MMHG | WEIGHT: 159.2 LBS | RESPIRATION RATE: 16 BRPM | BODY MASS INDEX: 21.56 KG/M2 | OXYGEN SATURATION: 98 % | HEIGHT: 72 IN | SYSTOLIC BLOOD PRESSURE: 135 MMHG | TEMPERATURE: 98.7 F

## 2024-11-18 DIAGNOSIS — J40 BRONCHITIS: Primary | ICD-10-CM

## 2024-11-18 DIAGNOSIS — R05.9 COUGH, UNSPECIFIED TYPE: ICD-10-CM

## 2024-11-18 PROCEDURE — 99283 EMERGENCY DEPT VISIT LOW MDM: CPT

## 2024-11-18 PROCEDURE — 71046 X-RAY EXAM CHEST 2 VIEWS: CPT

## 2024-11-18 PROCEDURE — 6370000000 HC RX 637 (ALT 250 FOR IP): Performed by: EMERGENCY MEDICINE

## 2024-11-18 RX ORDER — AZITHROMYCIN 250 MG/1
500 TABLET, FILM COATED ORAL ONCE
Status: COMPLETED | OUTPATIENT
Start: 2024-11-18 | End: 2024-11-18

## 2024-11-18 RX ORDER — AZITHROMYCIN 250 MG/1
TABLET, FILM COATED ORAL
Qty: 1 PACKET | Refills: 0 | Status: SHIPPED | OUTPATIENT
Start: 2024-11-18 | End: 2024-11-22

## 2024-11-18 RX ADMIN — AZITHROMYCIN DIHYDRATE 500 MG: 250 TABLET ORAL at 22:53

## 2024-11-18 ASSESSMENT — PAIN - FUNCTIONAL ASSESSMENT
PAIN_FUNCTIONAL_ASSESSMENT: NONE - DENIES PAIN
PAIN_FUNCTIONAL_ASSESSMENT: NONE - DENIES PAIN

## 2024-11-18 ASSESSMENT — LIFESTYLE VARIABLES
HOW OFTEN DO YOU HAVE A DRINK CONTAINING ALCOHOL: NEVER
HOW MANY STANDARD DRINKS CONTAINING ALCOHOL DO YOU HAVE ON A TYPICAL DAY: PATIENT DOES NOT DRINK

## 2024-11-19 ENCOUNTER — HOSPITAL ENCOUNTER (EMERGENCY)
Age: 29
Discharge: HOME | End: 2024-11-19
Attending: STUDENT IN AN ORGANIZED HEALTH CARE EDUCATION/TRAINING PROGRAM

## 2024-11-19 VITALS
RESPIRATION RATE: 16 BRPM | SYSTOLIC BLOOD PRESSURE: 129 MMHG | DIASTOLIC BLOOD PRESSURE: 78 MMHG | HEART RATE: 60 BPM | TEMPERATURE: 97.6 F | OXYGEN SATURATION: 99 %

## 2024-11-19 DIAGNOSIS — R51.9 ACUTE NONINTRACTABLE HEADACHE, UNSPECIFIED HEADACHE TYPE: Primary | ICD-10-CM

## 2024-11-19 PROCEDURE — 6360000002 HC RX W HCPCS: Performed by: STUDENT IN AN ORGANIZED HEALTH CARE EDUCATION/TRAINING PROGRAM

## 2024-11-19 PROCEDURE — 96374 THER/PROPH/DIAG INJ IV PUSH: CPT

## 2024-11-19 PROCEDURE — 96375 TX/PRO/DX INJ NEW DRUG ADDON: CPT

## 2024-11-19 PROCEDURE — 99284 EMERGENCY DEPT VISIT MOD MDM: CPT

## 2024-11-19 PROCEDURE — 2580000003 HC RX 258: Performed by: STUDENT IN AN ORGANIZED HEALTH CARE EDUCATION/TRAINING PROGRAM

## 2024-11-19 RX ORDER — KETOROLAC TROMETHAMINE 30 MG/ML
15 INJECTION, SOLUTION INTRAMUSCULAR; INTRAVENOUS ONCE
Status: COMPLETED | OUTPATIENT
Start: 2024-11-19 | End: 2024-11-19

## 2024-11-19 RX ORDER — 0.9 % SODIUM CHLORIDE 0.9 %
500 INTRAVENOUS SOLUTION INTRAVENOUS ONCE
Status: COMPLETED | OUTPATIENT
Start: 2024-11-19 | End: 2024-11-19

## 2024-11-19 RX ORDER — DIPHENHYDRAMINE HYDROCHLORIDE 50 MG/ML
50 INJECTION INTRAMUSCULAR; INTRAVENOUS ONCE
Status: COMPLETED | OUTPATIENT
Start: 2024-11-19 | End: 2024-11-19

## 2024-11-19 RX ORDER — PROCHLORPERAZINE EDISYLATE 5 MG/ML
10 INJECTION INTRAMUSCULAR; INTRAVENOUS ONCE
Status: COMPLETED | OUTPATIENT
Start: 2024-11-19 | End: 2024-11-19

## 2024-11-19 RX ADMIN — PROCHLORPERAZINE EDISYLATE 10 MG: 5 INJECTION INTRAMUSCULAR; INTRAVENOUS at 14:35

## 2024-11-19 RX ADMIN — DIPHENHYDRAMINE HYDROCHLORIDE 50 MG: 50 INJECTION INTRAMUSCULAR; INTRAVENOUS at 14:35

## 2024-11-19 RX ADMIN — KETOROLAC TROMETHAMINE 15 MG: 30 INJECTION, SOLUTION INTRAMUSCULAR at 14:35

## 2024-11-19 RX ADMIN — SODIUM CHLORIDE 500 ML: 9 INJECTION, SOLUTION INTRAVENOUS at 14:35

## 2024-11-19 ASSESSMENT — PAIN SCALES - GENERAL: PAINLEVEL_OUTOF10: 8

## 2024-11-19 ASSESSMENT — PAIN DESCRIPTION - LOCATION: LOCATION: HEAD

## 2024-11-19 ASSESSMENT — PAIN DESCRIPTION - DESCRIPTORS: DESCRIPTORS: ACHING;STABBING;SHOOTING

## 2024-11-19 ASSESSMENT — PAIN - FUNCTIONAL ASSESSMENT: PAIN_FUNCTIONAL_ASSESSMENT: 0-10

## 2024-11-19 ASSESSMENT — PAIN DESCRIPTION - PAIN TYPE: TYPE: ACUTE PAIN

## 2024-11-19 NOTE — DISCHARGE INSTRUCTIONS
You were seen in the emergency department today for your headache.  I would recommend following with your primary care doctor or your neurologist, Arizmendi manages your migraine since you are having increased migraine frequency not responsive to Nurtec.  If you having strokelike symptoms, fevers, chills, please return to the emergency department

## 2024-11-19 NOTE — ED PROVIDER NOTES
RIMEGEPANT SULFATE (NURTEC) 75 MG TBDP    Take 75 mg by mouth daily as needed (MIGRAINE) MIGRAINE       ALLERGIES     Patient has no known allergies.    FAMILYHISTORY       Family History   Problem Relation Age of Onset    Miscarriages / Stillbirths Mother     High Blood Pressure Father     Arthritis Father     High Cholesterol Father     Coronary Art Dis Father     Heart Attack Father     No Known Problems Sister     No Known Problems Brother         SOCIAL HISTORY       Social History     Tobacco Use    Smoking status: Every Day     Types: E-Cigarettes    Smokeless tobacco: Never   Vaping Use    Vaping status: Every Day    Substances: Nicotine, Flavoring    Devices: Disposable   Substance Use Topics    Alcohol use: Yes     Alcohol/week: 1.0 standard drink of alcohol     Types: 1 Glasses of wine per week     Comment: Holidays only    Drug use: No       SCREENINGS        Thomson Coma Scale  Eye Opening: Spontaneous  Best Verbal Response: Oriented  Best Motor Response: Obeys commands  Oumar Coma Scale Score: 15                CIWA Assessment  BP: 129/78  Pulse: 60           PHYSICAL EXAM  1 or more Elements     ED Triage Vitals [11/19/24 1306]   BP Systolic BP Percentile Diastolic BP Percentile Temp Temp Source Pulse Respirations SpO2   129/78 -- -- 97.6 °F (36.4 °C) Oral 60 16 99 %      Height Weight         -- --               Physical Exam  Vitals and nursing note reviewed.   Constitutional:       Appearance: Normal appearance.   HENT:      Head: Normocephalic and atraumatic.      Right Ear: External ear normal.      Left Ear: External ear normal.      Nose: Nose normal.      Mouth/Throat:      Mouth: Mucous membranes are moist.   Eyes:      Extraocular Movements: Extraocular movements intact.      Pupils: Pupils are equal, round, and reactive to light.   Cardiovascular:      Rate and Rhythm: Normal rate and regular rhythm.      Pulses: Normal pulses.      Heart sounds: Normal heart sounds.   Pulmonary:

## 2024-11-19 NOTE — ED PROVIDER NOTES
HPI:  11/18/24,   Time: 10:04 PM BELÉN Berger is a 29 y.o. female presenting to the ED for coughing, beginning days ago.  The complaint has been persistent, moderate in severity, and worsened by nothing.  No chest pain no leg swelling no calf pain she does not smoke she is not on birth control pills she is not hypoxic her sat is 98% on room air heart rate is just about 100-103 she her when I listen to her heart it was as she is lower than that she is in no acute distress she is drinking fluids urinating normally    ROS:   Pertinent positives and negatives are stated within HPI, all other systems reviewed and are negative.  --------------------------------------------- PAST HISTORY ---------------------------------------------  Past Medical History:  has a past medical history of ADHD (attention deficit hyperactivity disorder), Depression, and Headache.    Past Surgical History:  has a past surgical history that includes [place] tooth extraction and Dilation and curettage of uterus (N/A, 10/27/2019).    Social History:  reports that she has been smoking e-cigarettes. She has never used smokeless tobacco. She reports current alcohol use of about 1.0 standard drink of alcohol per week. She reports that she does not use drugs.    Family History: family history includes Arthritis in her father; Coronary Art Dis in her father; Heart Attack in her father; High Blood Pressure in her father; High Cholesterol in her father; Miscarriages / Stillbirths in her mother; No Known Problems in her brother and sister.     The patient’s home medications have been reviewed.    Allergies: Patient has no known allergies.    -------------------------------------------------- RESULTS -------------------------------------------------  All laboratory and radiology results have been personally reviewed by myself   LABS:  No results found for this visit on 11/18/24.    RADIOLOGY:  Interpreted by Radiologist.  XR CHEST (2 VW)

## 2024-12-18 PROBLEM — R05.9 COUGH: Status: RESOLVED | Noted: 2024-11-18 | Resolved: 2024-12-18

## 2025-02-01 ENCOUNTER — APPOINTMENT (OUTPATIENT)
Dept: ULTRASOUND IMAGING | Age: 30
End: 2025-02-01
Payer: COMMERCIAL

## 2025-02-01 LAB
ABO + RH BLD: NORMAL
ALBUMIN SERPL-MCNC: 4.3 G/DL (ref 3.5–5.2)
ALP SERPL-CCNC: 36 U/L (ref 35–104)
ALT SERPL-CCNC: 10 U/L (ref 0–32)
AMORPH SED URNS QL MICRO: PRESENT
ANION GAP SERPL CALCULATED.3IONS-SCNC: 10 MMOL/L (ref 7–16)
AST SERPL-CCNC: 13 U/L (ref 0–31)
B-HCG SERPL EIA 3RD IS-ACNC: 635.1 MIU/ML (ref 0–7)
BACTERIA URNS QL MICRO: ABNORMAL
BASOPHILS # BLD: 0.04 K/UL (ref 0–0.2)
BASOPHILS NFR BLD: 1 % (ref 0–2)
BILIRUB SERPL-MCNC: 0.4 MG/DL (ref 0–1.2)
BILIRUB UR QL STRIP: NEGATIVE
BUN SERPL-MCNC: 12 MG/DL (ref 6–20)
CALCIUM SERPL-MCNC: 9.3 MG/DL (ref 8.6–10.2)
CHLORIDE SERPL-SCNC: 105 MMOL/L (ref 98–107)
CLARITY UR: CLEAR
CO2 SERPL-SCNC: 24 MMOL/L (ref 22–29)
COLOR UR: YELLOW
CREAT SERPL-MCNC: 0.6 MG/DL (ref 0.5–1)
EOSINOPHIL # BLD: 0.08 K/UL (ref 0.05–0.5)
EOSINOPHILS RELATIVE PERCENT: 1 % (ref 0–6)
EPI CELLS #/AREA URNS HPF: ABNORMAL /HPF
ERYTHROCYTE [DISTWIDTH] IN BLOOD BY AUTOMATED COUNT: 11.4 % (ref 11.5–15)
GFR, ESTIMATED: >90 ML/MIN/1.73M2
GLUCOSE SERPL-MCNC: 68 MG/DL (ref 74–99)
GLUCOSE UR STRIP-MCNC: NEGATIVE MG/DL
HCG, URINE, POC: POSITIVE
HCT VFR BLD AUTO: 38.3 % (ref 34–48)
HGB BLD-MCNC: 12.6 G/DL (ref 11.5–15.5)
HGB UR QL STRIP.AUTO: NEGATIVE
IMM GRANULOCYTES # BLD AUTO: <0.03 K/UL (ref 0–0.58)
IMM GRANULOCYTES NFR BLD: 0 % (ref 0–5)
KETONES UR STRIP-MCNC: NEGATIVE MG/DL
LEUKOCYTE ESTERASE UR QL STRIP: NEGATIVE
LYMPHOCYTES NFR BLD: 2.27 K/UL (ref 1.5–4)
LYMPHOCYTES RELATIVE PERCENT: 27 % (ref 20–42)
Lab: NORMAL
MCH RBC QN AUTO: 32.9 PG (ref 26–35)
MCHC RBC AUTO-ENTMCNC: 32.9 G/DL (ref 32–34.5)
MCV RBC AUTO: 100 FL (ref 80–99.9)
MONOCYTES NFR BLD: 0.75 K/UL (ref 0.1–0.95)
MONOCYTES NFR BLD: 9 % (ref 2–12)
NEGATIVE QC PASS/FAIL: NORMAL
NEUTROPHILS NFR BLD: 62 % (ref 43–80)
NEUTS SEG NFR BLD: 5.23 K/UL (ref 1.8–7.3)
NITRITE UR QL STRIP: NEGATIVE
PH UR STRIP: 7 [PH] (ref 5–8)
PLATELET # BLD AUTO: 269 K/UL (ref 130–450)
PMV BLD AUTO: 9.8 FL (ref 7–12)
POSITIVE QC PASS/FAIL: NORMAL
POTASSIUM SERPL-SCNC: 3.8 MMOL/L (ref 3.5–5)
PROT SERPL-MCNC: 7.4 G/DL (ref 6.4–8.3)
PROT UR STRIP-MCNC: NEGATIVE MG/DL
RBC # BLD AUTO: 3.83 M/UL (ref 3.5–5.5)
RBC #/AREA URNS HPF: ABNORMAL /HPF
SODIUM SERPL-SCNC: 139 MMOL/L (ref 132–146)
SP GR UR STRIP: 1.02 (ref 1–1.03)
UROBILINOGEN UR STRIP-ACNC: 0.2 EU/DL (ref 0–1)
WBC #/AREA URNS HPF: ABNORMAL /HPF
WBC OTHER # BLD: 8.4 K/UL (ref 4.5–11.5)

## 2025-02-01 PROCEDURE — 80053 COMPREHEN METABOLIC PANEL: CPT

## 2025-02-01 PROCEDURE — 76817 TRANSVAGINAL US OBSTETRIC: CPT

## 2025-02-01 PROCEDURE — 81001 URINALYSIS AUTO W/SCOPE: CPT

## 2025-02-01 PROCEDURE — 86900 BLOOD TYPING SEROLOGIC ABO: CPT

## 2025-02-01 PROCEDURE — 99284 EMERGENCY DEPT VISIT MOD MDM: CPT

## 2025-02-01 PROCEDURE — 85025 COMPLETE CBC W/AUTO DIFF WBC: CPT

## 2025-02-01 PROCEDURE — 84702 CHORIONIC GONADOTROPIN TEST: CPT

## 2025-02-01 PROCEDURE — 86901 BLOOD TYPING SEROLOGIC RH(D): CPT

## 2025-02-01 ASSESSMENT — PAIN SCALES - GENERAL: PAINLEVEL_OUTOF10: 10

## 2025-02-02 ENCOUNTER — HOSPITAL ENCOUNTER (EMERGENCY)
Age: 30
Discharge: HOME | End: 2025-02-02
Attending: STUDENT IN AN ORGANIZED HEALTH CARE EDUCATION/TRAINING PROGRAM
Payer: COMMERCIAL

## 2025-02-02 VITALS
SYSTOLIC BLOOD PRESSURE: 109 MMHG | HEART RATE: 92 BPM | TEMPERATURE: 98.1 F | HEIGHT: 72 IN | DIASTOLIC BLOOD PRESSURE: 75 MMHG | OXYGEN SATURATION: 100 % | BODY MASS INDEX: 21.67 KG/M2 | WEIGHT: 160 LBS | RESPIRATION RATE: 16 BRPM

## 2025-02-02 DIAGNOSIS — O26.891 ABDOMINAL PAIN DURING PREGNANCY IN FIRST TRIMESTER: Primary | ICD-10-CM

## 2025-02-02 DIAGNOSIS — R10.9 ABDOMINAL PAIN DURING PREGNANCY IN FIRST TRIMESTER: Primary | ICD-10-CM

## 2025-02-02 RX ORDER — PRENATAL VIT,CAL 73/IRON/FOLIC 28 MG-1 MG
1 TABLET ORAL DAILY
Qty: 30 TABLET | Refills: 0 | Status: SHIPPED | OUTPATIENT
Start: 2025-02-02 | End: 2025-02-09

## 2025-02-02 NOTE — DISCHARGE INSTRUCTIONS
Follow-up with OB/GYN and obtain repeat hCG in the next  48 hours.  Return for any significant worsening symptoms, pain, or other acute symptoms or concerns.

## 2025-02-02 NOTE — ED PROVIDER NOTES
called back and states patient can call to follow up on Monday.  [KG]      ED Course User Index  [KG] Kori Palomo,           Medical Decision Making/Differential Diagnosis:    CC/HPI Summary, Social Determinants of health, Records Reviewed, DDx, testing done/not done, ED Course, Reassessment, disposition considerations/shared decision making with patient, consults, disposition:        The patient is a 29-year-old female presenting with lower abdominal pain during pregnancy.  Patient is hemodynamically stable, nontoxic, in no acute distress.     Patient is a daily smoker was a known social detriment to her health.  Prior records were reviewed and patient did follow-up with family doctor the office on 9- and records were reviewed from this visit..     Differential diagnosis includes but is not limited to threatened miscarriage versus ectopic pregnancy versus incomplete miscarriage versus implantation pain.     CMP does not show significant acute abnormal findings.  She is not hypoglycemic with a blood sugar of 68 but not have any symptoms of hypoglycemia at this time.  No electro abnormalities.  Creatinine is normal.  LFTs are normal.  hCG 635.  CBC does not show any leukocytosis or anemia.  Urinalysis without any signs of acute infection.  Patient is blood type O+.  She is not having any vaginal bleeding this time just lower abdominal cramping.  Ultrasound shows pregnancy of unknown location and there is a small sac area in the left upper uterus near the wall.  There is no yolk sac or fetal pole present.  This may be an early intrauterine gestation but the location is abnormal.    I did discuss with OB/GYN since she currently does not have an OB to follow-up with.  I ordered her a repeat hCG within the next 48 hours.  Recommended close follow-up with OB.  She is to return here if she develops any significant bleeding or worsening pain or other acute symptoms or concerns.  Patient in agreement with

## 2025-02-02 NOTE — ED NOTES
Department of Emergency Medicine  FIRST PROVIDER TRIAGE NOTE             Independent MLP           2/1/25  10:16 PM EST    Date of Encounter: 2/1/25   MRN: [id number]      HPI: Pooja Berger is a 29 y.o. female who presents to the ED for Abdominal Pain (Pain onset yesterday (cramping and sharp pains), positive pregnancy test this week.)     DIFFUSE ABDOMINAL PAIN.  THIRD PREGNANCY, ONE LIVING CHILD, ONE MISCARRIAGE.  STATES THIS FEELS SIMILAR.     ROS: Negative for cp or sob.    PE: Gen Appearance/Constitutional: alert  HEENT: NC/NT. PERRLA,  Airway patent.    Provider-Patient relationship only established for Provider In Triage (PIT)  Full assessment, HPI and examination not performed, therefore, it is not yet possible to state whether or not an emergency medical condition exists   Focused assessment only during triage. This is not a comprehensive evaluation due to no physical ER space, staff shortage and high numbers of boarding patients in the ER.       Initial Plan of Care: All treatment areas with department are currently occupied. Plan to order/Initiate the following while awaiting opening in ED.  Initiate Treatment-Testing, Proceed toTreatment Area When Bed Available for ED Attending/MLP to Continue Care    Electronically signed by IMANI Sumner CNP   DD: 2/1/25      Greg Lopez APRN - CNP  02/01/25 [id number]

## 2025-02-03 ENCOUNTER — HOSPITAL ENCOUNTER (OUTPATIENT)
Age: 30
Discharge: HOME | End: 2025-02-03
Payer: COMMERCIAL

## 2025-02-03 DIAGNOSIS — O26.891 ABDOMINAL PAIN DURING PREGNANCY IN FIRST TRIMESTER: ICD-10-CM

## 2025-02-03 DIAGNOSIS — R10.9 ABDOMINAL PAIN DURING PREGNANCY IN FIRST TRIMESTER: ICD-10-CM

## 2025-02-03 LAB — B-HCG SERPL EIA 3RD IS-ACNC: 1630 MIU/ML (ref 0–7)

## 2025-02-03 PROCEDURE — 84702 CHORIONIC GONADOTROPIN TEST: CPT

## 2025-02-13 ENCOUNTER — APPOINTMENT (OUTPATIENT)
Dept: ULTRASOUND IMAGING | Age: 30
End: 2025-02-13
Payer: COMMERCIAL

## 2025-02-13 ENCOUNTER — HOSPITAL ENCOUNTER (EMERGENCY)
Age: 30
Discharge: HOME | End: 2025-02-13
Payer: COMMERCIAL

## 2025-02-13 VITALS
RESPIRATION RATE: 16 BRPM | TEMPERATURE: 98.8 F | WEIGHT: 166 LBS | SYSTOLIC BLOOD PRESSURE: 103 MMHG | HEIGHT: 72 IN | BODY MASS INDEX: 22.48 KG/M2 | DIASTOLIC BLOOD PRESSURE: 73 MMHG | OXYGEN SATURATION: 100 % | HEART RATE: 84 BPM

## 2025-02-13 DIAGNOSIS — R10.30 LOWER ABDOMINAL PAIN: Primary | ICD-10-CM

## 2025-02-13 DIAGNOSIS — R11.0 NAUSEA IN ADULT: ICD-10-CM

## 2025-02-13 DIAGNOSIS — R10.9 FLANK PAIN: ICD-10-CM

## 2025-02-13 LAB
ALBUMIN SERPL-MCNC: 4.4 G/DL (ref 3.5–5.2)
ALP SERPL-CCNC: 35 U/L (ref 35–104)
ALT SERPL-CCNC: 10 U/L (ref 0–32)
ANION GAP SERPL CALCULATED.3IONS-SCNC: 11 MMOL/L (ref 7–16)
AST SERPL-CCNC: 17 U/L (ref 0–31)
B-HCG SERPL EIA 3RD IS-ACNC: ABNORMAL MIU/ML (ref 0–7)
BACTERIA URNS QL MICRO: ABNORMAL
BASOPHILS # BLD: 0.04 K/UL (ref 0–0.2)
BASOPHILS NFR BLD: 1 % (ref 0–2)
BILIRUB SERPL-MCNC: 0.4 MG/DL (ref 0–1.2)
BILIRUB UR QL STRIP: NEGATIVE
BUN SERPL-MCNC: 10 MG/DL (ref 6–20)
CALCIUM SERPL-MCNC: 9.2 MG/DL (ref 8.6–10.2)
CHLORIDE SERPL-SCNC: 101 MMOL/L (ref 98–107)
CLARITY UR: CLEAR
CO2 SERPL-SCNC: 23 MMOL/L (ref 22–29)
COLOR UR: YELLOW
CREAT SERPL-MCNC: 0.6 MG/DL (ref 0.5–1)
EOSINOPHIL # BLD: 0.11 K/UL (ref 0.05–0.5)
EOSINOPHILS RELATIVE PERCENT: 1 % (ref 0–6)
ERYTHROCYTE [DISTWIDTH] IN BLOOD BY AUTOMATED COUNT: 11.3 % (ref 11.5–15)
GFR, ESTIMATED: >90 ML/MIN/1.73M2
GLUCOSE SERPL-MCNC: 91 MG/DL (ref 74–99)
GLUCOSE UR STRIP-MCNC: NEGATIVE MG/DL
HCG, URINE, POC: POSITIVE
HCT VFR BLD AUTO: 36.8 % (ref 34–48)
HGB BLD-MCNC: 12.1 G/DL (ref 11.5–15.5)
HGB UR QL STRIP.AUTO: NEGATIVE
IMM GRANULOCYTES # BLD AUTO: <0.03 K/UL (ref 0–0.58)
IMM GRANULOCYTES NFR BLD: 0 % (ref 0–5)
KETONES UR STRIP-MCNC: NEGATIVE MG/DL
LEUKOCYTE ESTERASE UR QL STRIP: NEGATIVE
LYMPHOCYTES NFR BLD: 2.85 K/UL (ref 1.5–4)
LYMPHOCYTES RELATIVE PERCENT: 35 % (ref 20–42)
Lab: NORMAL
MCH RBC QN AUTO: 32.5 PG (ref 26–35)
MCHC RBC AUTO-ENTMCNC: 32.9 G/DL (ref 32–34.5)
MCV RBC AUTO: 98.9 FL (ref 80–99.9)
MONOCYTES NFR BLD: 0.76 K/UL (ref 0.1–0.95)
MONOCYTES NFR BLD: 9 % (ref 2–12)
MUCOUS THREADS URNS QL MICRO: PRESENT
NEGATIVE QC PASS/FAIL: NORMAL
NEUTROPHILS NFR BLD: 54 % (ref 43–80)
NEUTS SEG NFR BLD: 4.48 K/UL (ref 1.8–7.3)
NITRITE UR QL STRIP: NEGATIVE
PH UR STRIP: 7 [PH] (ref 5–8)
PLATELET # BLD AUTO: 269 K/UL (ref 130–450)
PMV BLD AUTO: 9.6 FL (ref 7–12)
POSITIVE QC PASS/FAIL: NORMAL
POTASSIUM SERPL-SCNC: 4.1 MMOL/L (ref 3.5–5)
PROT SERPL-MCNC: 7.4 G/DL (ref 6.4–8.3)
PROT UR STRIP-MCNC: NEGATIVE MG/DL
RBC # BLD AUTO: 3.72 M/UL (ref 3.5–5.5)
RBC #/AREA URNS HPF: ABNORMAL /HPF
SODIUM SERPL-SCNC: 135 MMOL/L (ref 132–146)
SP GR UR STRIP: 1.02 (ref 1–1.03)
UROBILINOGEN UR STRIP-ACNC: 0.2 EU/DL (ref 0–1)
WBC #/AREA URNS HPF: ABNORMAL /HPF
WBC OTHER # BLD: 8.3 K/UL (ref 4.5–11.5)

## 2025-02-13 PROCEDURE — 84702 CHORIONIC GONADOTROPIN TEST: CPT

## 2025-02-13 PROCEDURE — 76817 TRANSVAGINAL US OBSTETRIC: CPT

## 2025-02-13 PROCEDURE — 76770 US EXAM ABDO BACK WALL COMP: CPT

## 2025-02-13 PROCEDURE — 99284 EMERGENCY DEPT VISIT MOD MDM: CPT

## 2025-02-13 PROCEDURE — 80053 COMPREHEN METABOLIC PANEL: CPT

## 2025-02-13 PROCEDURE — 81001 URINALYSIS AUTO W/SCOPE: CPT

## 2025-02-13 PROCEDURE — 85025 COMPLETE CBC W/AUTO DIFF WBC: CPT

## 2025-02-13 RX ORDER — ONDANSETRON 4 MG/1
4 TABLET, ORALLY DISINTEGRATING ORAL 3 TIMES DAILY PRN
Qty: 30 TABLET | Refills: 0 | Status: SHIPPED | OUTPATIENT
Start: 2025-02-13

## 2025-02-13 ASSESSMENT — PAIN SCALES - GENERAL: PAINLEVEL_OUTOF10: 8

## 2025-02-13 ASSESSMENT — PAIN - FUNCTIONAL ASSESSMENT: PAIN_FUNCTIONAL_ASSESSMENT: 0-10

## 2025-02-13 NOTE — DISCHARGE INSTRUCTIONS
Please call Dr. Schmidt tomorrow to establish as OB/GYN.  Please discuss today's ER visit.  Everything looks okay on ultrasound.  hCG levels are continuing to elevate.  Continue taking prenatal vitamin.  Will send additional Zofran to your pharmacy for any nausea or vomiting.  Make sure you are drinking adequate amount of fluids.  He develop any new or concerning symptoms, return to the emergency department for reevaluation.

## 2025-02-13 NOTE — ED PROVIDER NOTES
[address]  [facility] [address]  [phone]    Call in 1 day  for follow up    [facility] Emergency Department  [address] [address]  [phone]    If symptoms worsen    Aide Schmidt MD  [address] [place]  3rd Floor  [facility] [address]  [phone]    Call in 1 day  for follow up    DISCHARGE MEDICATIONS:  Discharge Medication List as of 2/13/2025  3:08 AM        DISCONTINUED MEDICATIONS:  Discharge Medication List as of 2/13/2025  3:08 AM        (Please note that portions of this note were completed with a voice recognition program.  Efforts were made to edit the dictations but occasionally words are mis-transcribed.)    IMANI Corcoran - DC (electronically signed)     Harsha Thornton APRN - NP  02/15/25 [id number]

## 2025-03-20 ENCOUNTER — HOSPITAL ENCOUNTER (EMERGENCY)
Age: 30
Discharge: HOME | End: 2025-03-21
Attending: STUDENT IN AN ORGANIZED HEALTH CARE EDUCATION/TRAINING PROGRAM
Payer: COMMERCIAL

## 2025-03-20 VITALS
HEART RATE: 94 BPM | DIASTOLIC BLOOD PRESSURE: 82 MMHG | WEIGHT: 170 LBS | HEIGHT: 72 IN | SYSTOLIC BLOOD PRESSURE: 118 MMHG | TEMPERATURE: 97.8 F | RESPIRATION RATE: 16 BRPM | OXYGEN SATURATION: 97 % | BODY MASS INDEX: 23.03 KG/M2

## 2025-03-20 DIAGNOSIS — R10.9 ABDOMINAL PAIN DURING PREGNANCY IN FIRST TRIMESTER: Primary | ICD-10-CM

## 2025-03-20 DIAGNOSIS — O26.891 ABDOMINAL PAIN DURING PREGNANCY IN FIRST TRIMESTER: Primary | ICD-10-CM

## 2025-03-20 PROCEDURE — 96374 THER/PROPH/DIAG INJ IV PUSH: CPT

## 2025-03-20 PROCEDURE — 99284 EMERGENCY DEPT VISIT MOD MDM: CPT

## 2025-03-20 ASSESSMENT — PAIN SCALES - GENERAL: PAINLEVEL_OUTOF10: 10

## 2025-03-20 ASSESSMENT — PAIN - FUNCTIONAL ASSESSMENT: PAIN_FUNCTIONAL_ASSESSMENT: 0-10

## 2025-03-20 ASSESSMENT — PAIN DESCRIPTION - LOCATION: LOCATION: ABDOMEN

## 2025-03-20 ASSESSMENT — PAIN DESCRIPTION - ORIENTATION: ORIENTATION: LOWER

## 2025-03-21 LAB
ALBUMIN SERPL-MCNC: 3.9 G/DL (ref 3.5–5.2)
ALP SERPL-CCNC: 35 U/L (ref 35–104)
ALT SERPL-CCNC: 11 U/L (ref 0–32)
AMORPH SED URNS QL MICRO: PRESENT
ANION GAP SERPL CALCULATED.3IONS-SCNC: 13 MMOL/L (ref 7–16)
AST SERPL-CCNC: 14 U/L (ref 0–31)
B-HCG SERPL EIA 3RD IS-ACNC: ABNORMAL MIU/ML (ref 0–7)
BACTERIA URNS QL MICRO: ABNORMAL
BASOPHILS # BLD: 0.03 K/UL (ref 0–0.2)
BASOPHILS NFR BLD: 0 % (ref 0–2)
BILIRUB SERPL-MCNC: 0.3 MG/DL (ref 0–1.2)
BILIRUB UR QL STRIP: NEGATIVE
BUN SERPL-MCNC: 9 MG/DL (ref 6–20)
CALCIUM SERPL-MCNC: 9.3 MG/DL (ref 8.6–10.2)
CHLORIDE SERPL-SCNC: 102 MMOL/L (ref 98–107)
CLARITY UR: ABNORMAL
CO2 SERPL-SCNC: 20 MMOL/L (ref 22–29)
COLOR UR: YELLOW
CREAT SERPL-MCNC: 0.5 MG/DL (ref 0.5–1)
EOSINOPHIL # BLD: 0.1 K/UL (ref 0.05–0.5)
EOSINOPHILS RELATIVE PERCENT: 1 % (ref 0–6)
EPI CELLS #/AREA URNS HPF: ABNORMAL /HPF
ERYTHROCYTE [DISTWIDTH] IN BLOOD BY AUTOMATED COUNT: 11.1 % (ref 11.5–15)
GFR, ESTIMATED: >90 ML/MIN/1.73M2
GLUCOSE SERPL-MCNC: 113 MG/DL (ref 74–99)
GLUCOSE UR STRIP-MCNC: NEGATIVE MG/DL
HCT VFR BLD AUTO: 38.4 % (ref 34–48)
HGB BLD-MCNC: 12.8 G/DL (ref 11.5–15.5)
HGB UR QL STRIP.AUTO: NEGATIVE
IMM GRANULOCYTES # BLD AUTO: 0.03 K/UL (ref 0–0.58)
IMM GRANULOCYTES NFR BLD: 0 % (ref 0–5)
KETONES UR STRIP-MCNC: NEGATIVE MG/DL
LACTATE BLDV-SCNC: 1.8 MMOL/L (ref 0.5–2.2)
LEUKOCYTE ESTERASE UR QL STRIP: NEGATIVE
LIPASE SERPL-CCNC: 41 U/L (ref 13–60)
LYMPHOCYTES NFR BLD: 2.51 K/UL (ref 1.5–4)
LYMPHOCYTES RELATIVE PERCENT: 24 % (ref 20–42)
MAGNESIUM SERPL-MCNC: 2 MG/DL (ref 1.6–2.6)
MCH RBC QN AUTO: 32.6 PG (ref 26–35)
MCHC RBC AUTO-ENTMCNC: 33.3 G/DL (ref 32–34.5)
MCV RBC AUTO: 97.7 FL (ref 80–99.9)
MONOCYTES NFR BLD: 0.79 K/UL (ref 0.1–0.95)
MONOCYTES NFR BLD: 7 % (ref 2–12)
NEUTROPHILS NFR BLD: 68 % (ref 43–80)
NEUTS SEG NFR BLD: 7.22 K/UL (ref 1.8–7.3)
NITRITE UR QL STRIP: NEGATIVE
PH UR STRIP: 6.5 [PH] (ref 5–8)
PLATELET # BLD AUTO: 258 K/UL (ref 130–450)
PMV BLD AUTO: 9.9 FL (ref 7–12)
POTASSIUM SERPL-SCNC: 3.4 MMOL/L (ref 3.5–5)
PROT SERPL-MCNC: 7.2 G/DL (ref 6.4–8.3)
PROT UR STRIP-MCNC: NEGATIVE MG/DL
RBC # BLD AUTO: 3.93 M/UL (ref 3.5–5.5)
RBC #/AREA URNS HPF: ABNORMAL /HPF
SODIUM SERPL-SCNC: 135 MMOL/L (ref 132–146)
SP GR UR STRIP: 1.02 (ref 1–1.03)
UROBILINOGEN UR STRIP-ACNC: 0.2 EU/DL (ref 0–1)
WBC #/AREA URNS HPF: ABNORMAL /HPF
WBC OTHER # BLD: 10.7 K/UL (ref 4.5–11.5)

## 2025-03-21 PROCEDURE — 83605 ASSAY OF LACTIC ACID: CPT

## 2025-03-21 PROCEDURE — 80053 COMPREHEN METABOLIC PANEL: CPT

## 2025-03-21 PROCEDURE — 83690 ASSAY OF LIPASE: CPT

## 2025-03-21 PROCEDURE — 83735 ASSAY OF MAGNESIUM: CPT

## 2025-03-21 PROCEDURE — 84702 CHORIONIC GONADOTROPIN TEST: CPT

## 2025-03-21 PROCEDURE — 6370000000 HC RX 637 (ALT 250 FOR IP): Performed by: STUDENT IN AN ORGANIZED HEALTH CARE EDUCATION/TRAINING PROGRAM

## 2025-03-21 PROCEDURE — 85025 COMPLETE CBC W/AUTO DIFF WBC: CPT

## 2025-03-21 PROCEDURE — 2580000003 HC RX 258: Performed by: STUDENT IN AN ORGANIZED HEALTH CARE EDUCATION/TRAINING PROGRAM

## 2025-03-21 PROCEDURE — 6360000002 HC RX W HCPCS: Performed by: STUDENT IN AN ORGANIZED HEALTH CARE EDUCATION/TRAINING PROGRAM

## 2025-03-21 PROCEDURE — 87086 URINE CULTURE/COLONY COUNT: CPT

## 2025-03-21 PROCEDURE — 81001 URINALYSIS AUTO W/SCOPE: CPT

## 2025-03-21 RX ORDER — ONDANSETRON 2 MG/ML
4 INJECTION INTRAMUSCULAR; INTRAVENOUS ONCE
Status: COMPLETED | OUTPATIENT
Start: 2025-03-21 | End: 2025-03-21

## 2025-03-21 RX ORDER — 0.9 % SODIUM CHLORIDE 0.9 %
1000 INTRAVENOUS SOLUTION INTRAVENOUS ONCE
Status: COMPLETED | OUTPATIENT
Start: 2025-03-21 | End: 2025-03-21

## 2025-03-21 RX ORDER — ACETAMINOPHEN 500 MG
1000 TABLET ORAL ONCE
Status: COMPLETED | OUTPATIENT
Start: 2025-03-21 | End: 2025-03-21

## 2025-03-21 RX ADMIN — SODIUM CHLORIDE 1000 ML: 9 INJECTION, SOLUTION INTRAVENOUS at 01:10

## 2025-03-21 RX ADMIN — ONDANSETRON 4 MG: 2 INJECTION, SOLUTION INTRAMUSCULAR; INTRAVENOUS at 01:11

## 2025-03-21 RX ADMIN — POTASSIUM BICARBONATE 20 MEQ: 782 TABLET, EFFERVESCENT ORAL at 03:28

## 2025-03-21 RX ADMIN — ACETAMINOPHEN 1000 MG: 500 TABLET ORAL at 01:12

## 2025-03-21 ASSESSMENT — PAIN SCALES - GENERAL: PAINLEVEL_OUTOF10: 8

## 2025-03-21 ASSESSMENT — PAIN DESCRIPTION - LOCATION: LOCATION: ABDOMEN

## 2025-03-21 ASSESSMENT — PAIN DESCRIPTION - DESCRIPTORS: DESCRIPTORS: SHARP

## 2025-03-21 ASSESSMENT — PAIN DESCRIPTION - ORIENTATION: ORIENTATION: LOWER

## 2025-03-22 LAB
MICROORGANISM SPEC CULT: NO GROWTH
SERVICE CMNT-IMP: NORMAL
SPECIMEN DESCRIPTION: NORMAL

## 2025-03-23 ENCOUNTER — HOSPITAL ENCOUNTER (EMERGENCY)
Age: 30
Discharge: HOME | End: 2025-03-24
Attending: EMERGENCY MEDICINE
Payer: COMMERCIAL

## 2025-03-23 ENCOUNTER — APPOINTMENT (OUTPATIENT)
Dept: GENERAL RADIOLOGY | Age: 30
End: 2025-03-23
Payer: COMMERCIAL

## 2025-03-23 VITALS
SYSTOLIC BLOOD PRESSURE: 110 MMHG | BODY MASS INDEX: 22.92 KG/M2 | RESPIRATION RATE: 16 BRPM | WEIGHT: 169 LBS | OXYGEN SATURATION: 98 % | TEMPERATURE: 98.1 F | DIASTOLIC BLOOD PRESSURE: 79 MMHG | HEART RATE: 103 BPM

## 2025-03-23 DIAGNOSIS — O21.0 MILD HYPEREMESIS GRAVIDARUM, ANTEPARTUM: ICD-10-CM

## 2025-03-23 DIAGNOSIS — J20.9 ACUTE BRONCHITIS, UNSPECIFIED ORGANISM: ICD-10-CM

## 2025-03-23 DIAGNOSIS — J40 BRONCHITIS: Primary | ICD-10-CM

## 2025-03-23 LAB
ALBUMIN SERPL-MCNC: 4 G/DL (ref 3.5–5.2)
ALP SERPL-CCNC: 39 U/L (ref 35–104)
ALT SERPL-CCNC: 4 U/L (ref 0–32)
AMORPH SED URNS QL MICRO: PRESENT
AMYLASE SERPL-CCNC: 72 U/L (ref 20–100)
ANION GAP SERPL CALCULATED.3IONS-SCNC: 13 MMOL/L (ref 7–16)
AST SERPL-CCNC: 14 U/L (ref 0–31)
B-HCG SERPL EIA 3RD IS-ACNC: ABNORMAL MIU/ML (ref 0–7)
BILIRUB DIRECT SERPL-MCNC: <0.2 MG/DL (ref 0–0.3)
BILIRUB INDIRECT SERPL-MCNC: NORMAL MG/DL (ref 0–1)
BILIRUB SERPL-MCNC: 0.3 MG/DL (ref 0–1.2)
BILIRUB UR QL STRIP: NEGATIVE
BNP SERPL-MCNC: 97 PG/ML (ref 0–125)
BUN SERPL-MCNC: 10 MG/DL (ref 6–20)
CALCIUM SERPL-MCNC: 9.7 MG/DL (ref 8.6–10.2)
CHLORIDE SERPL-SCNC: 99 MMOL/L (ref 98–107)
CK SERPL-CCNC: 40 U/L (ref 20–180)
CLARITY UR: ABNORMAL
CO2 SERPL-SCNC: 21 MMOL/L (ref 22–29)
COLOR UR: YELLOW
CREAT SERPL-MCNC: 0.5 MG/DL (ref 0.5–1)
D-DIMER QUANTITATIVE: <200 NG/ML DDU (ref 0–230)
EPI CELLS #/AREA URNS HPF: ABNORMAL /HPF
ERYTHROCYTE [DISTWIDTH] IN BLOOD BY AUTOMATED COUNT: 11.3 % (ref 11.5–15)
FLUAV RNA RESP QL NAA+PROBE: NOT DETECTED
FLUBV RNA RESP QL NAA+PROBE: NOT DETECTED
GFR, ESTIMATED: >90 ML/MIN/1.73M2
GLUCOSE SERPL-MCNC: 83 MG/DL (ref 74–99)
GLUCOSE UR STRIP-MCNC: NEGATIVE MG/DL
HCT VFR BLD AUTO: 36.3 % (ref 34–48)
HGB BLD-MCNC: 12.7 G/DL (ref 11.5–15.5)
HGB UR QL STRIP.AUTO: NEGATIVE
INR PPP: 1.1
KETONES UR STRIP-MCNC: NEGATIVE MG/DL
LACTATE BLDV-SCNC: 0.8 MMOL/L (ref 0.5–2.2)
LEUKOCYTE ESTERASE UR QL STRIP: NEGATIVE
LIPASE SERPL-CCNC: 34 U/L (ref 13–60)
MAGNESIUM SERPL-MCNC: 2.4 MG/DL (ref 1.6–2.6)
MCH RBC QN AUTO: 32.7 PG (ref 26–35)
MCHC RBC AUTO-ENTMCNC: 35 G/DL (ref 32–34.5)
MCV RBC AUTO: 93.6 FL (ref 80–99.9)
NITRITE UR QL STRIP: NEGATIVE
PH UR STRIP: 7 [PH] (ref 5–8)
PLATELET # BLD AUTO: 260 K/UL (ref 130–450)
PMV BLD AUTO: 9.6 FL (ref 7–12)
POTASSIUM SERPL-SCNC: 3.8 MMOL/L (ref 3.5–5)
PROT SERPL-MCNC: 7.2 G/DL (ref 6.4–8.3)
PROT UR STRIP-MCNC: NEGATIVE MG/DL
PROTHROMBIN TIME: 11.7 SEC (ref 9.3–12.4)
RBC # BLD AUTO: 3.88 M/UL (ref 3.5–5.5)
RBC #/AREA URNS HPF: ABNORMAL /HPF
RSV BY PCR: NOT DETECTED
SARS-COV-2 RNA RESP QL NAA+PROBE: NOT DETECTED
SODIUM SERPL-SCNC: 133 MMOL/L (ref 132–146)
SOURCE: NORMAL
SP GR UR STRIP: 1.02 (ref 1–1.03)
SPECIMEN DESCRIPTION: NORMAL
SPECIMEN SOURCE: NORMAL
TROPONIN I SERPL HS-MCNC: <6 NG/L (ref 0–9)
UROBILINOGEN UR STRIP-ACNC: 0.2 EU/DL (ref 0–1)
WBC #/AREA URNS HPF: ABNORMAL /HPF
WBC OTHER # BLD: 8.2 K/UL (ref 4.5–11.5)

## 2025-03-23 PROCEDURE — 87636 SARSCOV2 & INF A&B AMP PRB: CPT

## 2025-03-23 PROCEDURE — 85027 COMPLETE CBC AUTOMATED: CPT

## 2025-03-23 PROCEDURE — 96375 TX/PRO/DX INJ NEW DRUG ADDON: CPT

## 2025-03-23 PROCEDURE — 87634 RSV DNA/RNA AMP PROBE: CPT

## 2025-03-23 PROCEDURE — 83880 ASSAY OF NATRIURETIC PEPTIDE: CPT

## 2025-03-23 PROCEDURE — 81001 URINALYSIS AUTO W/SCOPE: CPT

## 2025-03-23 PROCEDURE — 82248 BILIRUBIN DIRECT: CPT

## 2025-03-23 PROCEDURE — 82550 ASSAY OF CK (CPK): CPT

## 2025-03-23 PROCEDURE — 71045 X-RAY EXAM CHEST 1 VIEW: CPT

## 2025-03-23 PROCEDURE — 84702 CHORIONIC GONADOTROPIN TEST: CPT

## 2025-03-23 PROCEDURE — 82150 ASSAY OF AMYLASE: CPT

## 2025-03-23 PROCEDURE — 93005 ELECTROCARDIOGRAM TRACING: CPT | Performed by: EMERGENCY MEDICINE

## 2025-03-23 PROCEDURE — 96374 THER/PROPH/DIAG INJ IV PUSH: CPT

## 2025-03-23 PROCEDURE — 83605 ASSAY OF LACTIC ACID: CPT

## 2025-03-23 PROCEDURE — 83735 ASSAY OF MAGNESIUM: CPT

## 2025-03-23 PROCEDURE — 85610 PROTHROMBIN TIME: CPT

## 2025-03-23 PROCEDURE — 87040 BLOOD CULTURE FOR BACTERIA: CPT

## 2025-03-23 PROCEDURE — 84484 ASSAY OF TROPONIN QUANT: CPT

## 2025-03-23 PROCEDURE — 87086 URINE CULTURE/COLONY COUNT: CPT

## 2025-03-23 PROCEDURE — 6360000002 HC RX W HCPCS: Performed by: EMERGENCY MEDICINE

## 2025-03-23 PROCEDURE — 83690 ASSAY OF LIPASE: CPT

## 2025-03-23 PROCEDURE — 99285 EMERGENCY DEPT VISIT HI MDM: CPT

## 2025-03-23 PROCEDURE — 85379 FIBRIN DEGRADATION QUANT: CPT

## 2025-03-23 PROCEDURE — 80053 COMPREHEN METABOLIC PANEL: CPT

## 2025-03-23 RX ORDER — METOCLOPRAMIDE HYDROCHLORIDE 5 MG/ML
10 INJECTION INTRAMUSCULAR; INTRAVENOUS ONCE
Status: COMPLETED | OUTPATIENT
Start: 2025-03-23 | End: 2025-03-23

## 2025-03-23 RX ORDER — DOXYLAMINE SUCCINATE AND PYRIDOXINE HYDROCHLORIDE, DELAYED RELEASE TABLETS 10 MG/10 MG 10; 10 MG/1; MG/1
2 TABLET, DELAYED RELEASE ORAL NIGHTLY PRN
Qty: 60 TABLET | Refills: 0 | Status: SHIPPED | OUTPATIENT
Start: 2025-03-23

## 2025-03-23 RX ORDER — AZITHROMYCIN 250 MG/1
500 TABLET, FILM COATED ORAL ONCE
Status: COMPLETED | OUTPATIENT
Start: 2025-03-23 | End: 2025-03-24

## 2025-03-23 RX ORDER — DIPHENHYDRAMINE HYDROCHLORIDE 50 MG/ML
12.5 INJECTION, SOLUTION INTRAMUSCULAR; INTRAVENOUS ONCE
Status: COMPLETED | OUTPATIENT
Start: 2025-03-23 | End: 2025-03-23

## 2025-03-23 RX ORDER — AZITHROMYCIN 250 MG/1
TABLET, FILM COATED ORAL
Qty: 1 PACKET | Refills: 0 | Status: SHIPPED | OUTPATIENT
Start: 2025-03-23 | End: 2025-03-27

## 2025-03-23 RX ADMIN — METOCLOPRAMIDE 10 MG: 5 INJECTION, SOLUTION INTRAMUSCULAR; INTRAVENOUS at 22:12

## 2025-03-23 RX ADMIN — DIPHENHYDRAMINE HYDROCHLORIDE 12.5 MG: 50 INJECTION INTRAMUSCULAR; INTRAVENOUS at 22:12

## 2025-03-24 ENCOUNTER — TELEPHONE (OUTPATIENT)
Dept: OBGYN | Age: 30
End: 2025-03-24

## 2025-03-24 LAB
EKG ATRIAL RATE: 82 BPM
EKG P AXIS: 67 DEGREES
EKG P-R INTERVAL: 148 MS
EKG Q-T INTERVAL: 382 MS
EKG QRS DURATION: 84 MS
EKG QTC CALCULATION (BAZETT): 446 MS
EKG R AXIS: 64 DEGREES
EKG T AXIS: 37 DEGREES
EKG VENTRICULAR RATE: 82 BPM

## 2025-03-24 PROCEDURE — 6370000000 HC RX 637 (ALT 250 FOR IP): Performed by: EMERGENCY MEDICINE

## 2025-03-24 PROCEDURE — 93010 ELECTROCARDIOGRAM REPORT: CPT | Performed by: INTERNAL MEDICINE

## 2025-03-24 RX ADMIN — AZITHROMYCIN DIHYDRATE 500 MG: 250 TABLET ORAL at 00:04

## 2025-03-24 NOTE — TELEPHONE ENCOUNTER
ED 3/23/25 for   Abdominal pain during pregnancy in first trimester; US @ [facility]. Patient states indicated she is at 12 wks and would like to get in as soon as possible. Please advise patient for scheduling recommendations [phone]

## 2025-03-24 NOTE — ED PROVIDER NOTES
and oriented x3, well appearing, non toxic in NAD  Head: Normocephalic and atraumatic  Eyes: PERRL, EOMI  Mouth: Oropharynx clear, handling secretions, no trismus  Neck: Supple, full ROM, non tender to palpation in the midline, no stridor, no crepitus, no meningeal signs  Pulmonary: Lungs clear to auscultation bilaterally, no wheezes, rales, or rhonchi. Not in respiratory distress  Cardiovascular:  Regular rate. Regular rhythm. No murmurs, gallops, or rubs. 2+ distal pulses  Chest: no chest wall tenderness  Abdomen: Soft.  Non tender. Non distended.  +BS.  No rebound, guarding, or rigidity. No pulsatile masses appreciated.  Musculoskeletal: Moves all extremities x 4. Warm and well perfused, no clubbing, cyanosis, or edema. Capillary refill <3 seconds  Skin: warm and dry. No rashes.   Neurologic: GCS 15, CN 2-12 grossly intact, no focal deficits, symmetric strength 5/5 in the upper and lower extremities bilaterally  Psych: Normal Affect    -------------------------------------------------- RESULTS -------------------------------------------------  I have personally reviewed all laboratory and imaging results for this patient. Results are listed below.     LABS:  Results for orders placed or performed during the hospital encounter of 03/23/25   COVID-19 & Influenza Combo    Specimen: Nasopharyngeal Swab   Result Value Ref Range    Specimen Description .NASOPHARYNGEAL SWAB     Source .NASOPHARYNGEAL SWAB     SARS-CoV-2 RNA, RT PCR Not Detected Not Detected    Influenza A Not Detected Not Detected    Influenza B Not Detected Not Detected   RSV Detection    Specimen: Nasopharyngeal Swab   Result Value Ref Range    Source .NASOPHARYNGEAL SWAB     RSV by PCR Not Detected Not Detected   Troponin   Result Value Ref Range    Troponin, High Sensitivity <6 0 - 9 ng/L   CBC   Result Value Ref Range    WBC 8.2 4.5 - 11.5 k/uL    RBC 3.88 3.50 - 5.50 m/uL    Hemoglobin 12.7 11.5 - 15.5 g/dL    Hematocrit 36.3 34.0 - 48.0 %    MCV

## 2025-03-24 NOTE — DISCHARGE INSTRUCTIONS
Return of chest pain shortness of breath fevers vomiting headache stiff neck vaginal bleeding discharge or abdominal pain

## 2025-03-25 DIAGNOSIS — N91.2 AMENORRHEA: Primary | ICD-10-CM

## 2025-03-25 PROBLEM — O03.4 INCOMPLETE MISCARRIAGE: Status: RESOLVED | Noted: 2019-10-26 | Resolved: 2025-03-25

## 2025-03-25 PROBLEM — F32.9 MAJOR DEPRESSIVE DISORDER WITHOUT PSYCHOTIC FEATURES: Status: RESOLVED | Noted: 2024-09-27 | Resolved: 2025-03-25

## 2025-03-25 PROBLEM — J20.9 ACUTE BRONCHITIS: Status: RESOLVED | Noted: 2024-11-18 | Resolved: 2025-03-25

## 2025-03-25 LAB
MICROORGANISM SPEC CULT: ABNORMAL
SERVICE CMNT-IMP: ABNORMAL
SPECIMEN DESCRIPTION: ABNORMAL

## 2025-03-25 NOTE — TELEPHONE ENCOUNTER
Called and left a detailed vm. Instructed on calling if any questions and number given. Instructed to call and schedule repeat ultrasound and number given and directed to follow the prompts for MFM.

## 2025-03-28 ENCOUNTER — APPOINTMENT (OUTPATIENT)
Dept: GENERAL RADIOLOGY | Age: 30
End: 2025-03-28
Payer: COMMERCIAL

## 2025-03-28 ENCOUNTER — HOSPITAL ENCOUNTER (EMERGENCY)
Age: 30
Discharge: HOME | End: 2025-03-29
Attending: STUDENT IN AN ORGANIZED HEALTH CARE EDUCATION/TRAINING PROGRAM
Payer: COMMERCIAL

## 2025-03-28 DIAGNOSIS — R00.2 PALPITATIONS: Primary | ICD-10-CM

## 2025-03-28 DIAGNOSIS — R06.02 SHORTNESS OF BREATH: ICD-10-CM

## 2025-03-28 LAB
ALBUMIN SERPL-MCNC: 3.7 G/DL (ref 3.5–5.2)
ALP SERPL-CCNC: 34 U/L (ref 35–104)
ALT SERPL-CCNC: 9 U/L (ref 0–32)
ANION GAP SERPL CALCULATED.3IONS-SCNC: 12 MMOL/L (ref 7–16)
AST SERPL-CCNC: 14 U/L (ref 0–31)
BASOPHILS # BLD: 0.03 K/UL (ref 0–0.2)
BASOPHILS NFR BLD: 0 % (ref 0–2)
BILIRUB SERPL-MCNC: 0.2 MG/DL (ref 0–1.2)
BILIRUB UR QL STRIP: NEGATIVE
BUN SERPL-MCNC: 10 MG/DL (ref 6–20)
CALCIUM SERPL-MCNC: 9.6 MG/DL (ref 8.6–10.2)
CHLORIDE SERPL-SCNC: 102 MMOL/L (ref 98–107)
CLARITY UR: CLEAR
CO2 SERPL-SCNC: 21 MMOL/L (ref 22–29)
COLOR UR: YELLOW
CREAT SERPL-MCNC: 0.6 MG/DL (ref 0.5–1)
EKG ATRIAL RATE: 84 BPM
EKG P AXIS: 69 DEGREES
EKG P-R INTERVAL: 148 MS
EKG Q-T INTERVAL: 380 MS
EKG QRS DURATION: 84 MS
EKG QTC CALCULATION (BAZETT): 449 MS
EKG R AXIS: 49 DEGREES
EKG T AXIS: 31 DEGREES
EKG VENTRICULAR RATE: 84 BPM
EOSINOPHIL # BLD: 0.08 K/UL (ref 0.05–0.5)
EOSINOPHILS RELATIVE PERCENT: 1 % (ref 0–6)
EPI CELLS #/AREA URNS HPF: NORMAL /HPF
ERYTHROCYTE [DISTWIDTH] IN BLOOD BY AUTOMATED COUNT: 11.2 % (ref 11.5–15)
GFR, ESTIMATED: >90 ML/MIN/1.73M2
GLUCOSE SERPL-MCNC: 79 MG/DL (ref 74–99)
GLUCOSE UR STRIP-MCNC: NEGATIVE MG/DL
HCT VFR BLD AUTO: 34.9 % (ref 34–48)
HGB BLD-MCNC: 11.4 G/DL (ref 11.5–15.5)
HGB UR QL STRIP.AUTO: NEGATIVE
IMM GRANULOCYTES # BLD AUTO: <0.03 K/UL (ref 0–0.58)
IMM GRANULOCYTES NFR BLD: 0 % (ref 0–5)
INFLUENZA A BY PCR: NOT DETECTED
INFLUENZA B BY PCR: NOT DETECTED
KETONES UR STRIP-MCNC: NEGATIVE MG/DL
LEUKOCYTE ESTERASE UR QL STRIP: NEGATIVE
LYMPHOCYTES NFR BLD: 2.27 K/UL (ref 1.5–4)
LYMPHOCYTES RELATIVE PERCENT: 23 % (ref 20–42)
MCH RBC QN AUTO: 32 PG (ref 26–35)
MCHC RBC AUTO-ENTMCNC: 32.7 G/DL (ref 32–34.5)
MCV RBC AUTO: 98 FL (ref 80–99.9)
MONOCYTES NFR BLD: 0.67 K/UL (ref 0.1–0.95)
MONOCYTES NFR BLD: 7 % (ref 2–12)
NEUTROPHILS NFR BLD: 69 % (ref 43–80)
NEUTS SEG NFR BLD: 6.73 K/UL (ref 1.8–7.3)
NITRITE UR QL STRIP: NEGATIVE
PH UR STRIP: 6 [PH] (ref 5–8)
PLATELET # BLD AUTO: 253 K/UL (ref 130–450)
PMV BLD AUTO: 9.5 FL (ref 7–12)
POTASSIUM SERPL-SCNC: 4 MMOL/L (ref 3.5–5)
PROT SERPL-MCNC: 6.6 G/DL (ref 6.4–8.3)
PROT UR STRIP-MCNC: NEGATIVE MG/DL
RBC # BLD AUTO: 3.56 M/UL (ref 3.5–5.5)
RBC #/AREA URNS HPF: NORMAL /HPF
SARS-COV-2 RDRP RESP QL NAA+PROBE: NOT DETECTED
SODIUM SERPL-SCNC: 135 MMOL/L (ref 132–146)
SP GR UR STRIP: 1.02 (ref 1–1.03)
SPECIMEN DESCRIPTION: NORMAL
TROPONIN I SERPL HS-MCNC: <6 NG/L (ref 0–9)
UROBILINOGEN UR STRIP-ACNC: 0.2 EU/DL (ref 0–1)
WBC #/AREA URNS HPF: NORMAL /HPF
WBC OTHER # BLD: 9.8 K/UL (ref 4.5–11.5)

## 2025-03-28 PROCEDURE — 85025 COMPLETE CBC W/AUTO DIFF WBC: CPT

## 2025-03-28 PROCEDURE — 87635 SARS-COV-2 COVID-19 AMP PRB: CPT

## 2025-03-28 PROCEDURE — 80053 COMPREHEN METABOLIC PANEL: CPT

## 2025-03-28 PROCEDURE — 2580000003 HC RX 258

## 2025-03-28 PROCEDURE — 87086 URINE CULTURE/COLONY COUNT: CPT

## 2025-03-28 PROCEDURE — 84484 ASSAY OF TROPONIN QUANT: CPT

## 2025-03-28 PROCEDURE — 99285 EMERGENCY DEPT VISIT HI MDM: CPT

## 2025-03-28 PROCEDURE — 93005 ELECTROCARDIOGRAM TRACING: CPT

## 2025-03-28 PROCEDURE — 81001 URINALYSIS AUTO W/SCOPE: CPT

## 2025-03-28 PROCEDURE — 87502 INFLUENZA DNA AMP PROBE: CPT

## 2025-03-28 PROCEDURE — 71046 X-RAY EXAM CHEST 2 VIEWS: CPT

## 2025-03-28 RX ORDER — 0.9 % SODIUM CHLORIDE 0.9 %
1000 INTRAVENOUS SOLUTION INTRAVENOUS ONCE
Status: COMPLETED | OUTPATIENT
Start: 2025-03-28 | End: 2025-03-29

## 2025-03-28 RX ADMIN — SODIUM CHLORIDE 1000 ML: 0.9 INJECTION, SOLUTION INTRAVENOUS at 23:29

## 2025-03-28 ASSESSMENT — PAIN - FUNCTIONAL ASSESSMENT: PAIN_FUNCTIONAL_ASSESSMENT: NONE - DENIES PAIN

## 2025-03-29 VITALS
HEIGHT: 72 IN | WEIGHT: 168 LBS | OXYGEN SATURATION: 100 % | SYSTOLIC BLOOD PRESSURE: 110 MMHG | TEMPERATURE: 97.5 F | HEART RATE: 78 BPM | BODY MASS INDEX: 22.75 KG/M2 | RESPIRATION RATE: 19 BRPM | DIASTOLIC BLOOD PRESSURE: 70 MMHG

## 2025-03-29 LAB
MICROORGANISM SPEC CULT: NORMAL
MICROORGANISM SPEC CULT: NORMAL
SERVICE CMNT-IMP: NORMAL
SERVICE CMNT-IMP: NORMAL
SPECIMEN DESCRIPTION: NORMAL
SPECIMEN DESCRIPTION: NORMAL
TROPONIN I SERPL HS-MCNC: <6 NG/L (ref 0–9)

## 2025-03-29 NOTE — ED PROVIDER NOTES
[facility] EMERGENCY DEPARTMENT  EMERGENCY DEPARTMENT ENCOUNTER        Pt Name: Pooja Berger  MRN: [id number]  Birthdate 1995  Date of evaluation: 3/28/2025  Provider: Javier Jc DO  PCP: Seamus Turner DO  Note Started: 8:56 PM EDT 3/28/25    CHIEF COMPLAINT       Chief Complaint   Patient presents with    Migraine     Pt had a migraine when she woke up along with N/V but she states she's been N/V since she became pregnant 13 weeks prior    Palpitations     Pt reports palpitations since this morning with no hx of cardiac issues except \"untreated SVT\" a few years ago - \"I usually hold my breath and it goes away\"    Dizziness     Lightheaded/dizzy since palpitations started around 0800    Shortness of Breath     Pt reports SOB when the palpitations started, states it went away when she sat down       HISTORY OF PRESENT ILLNESS: 1 or more Elements   History From: Patient    Limitations to history : None    Pooja Berger is a  32-week pregnant 29 y.o. female with past medical history of hyperemesis gravidarum, borderline personality, ADHD, thyroid nodule, chronic migraines, ADHD who presents to the ED with palpitations with associated shortness of breath that occurred around 20:30 this evening (3/28/2025) as well as cough and rhinorrhea for the past 2 days.  Patient states that she does not have any history of cardiac or pulmonary problems.  Patient mentions that she has nausea but this is nothing new from her entire pregnancy and does not want anything for nausea at this time.  Patient denies headache, abdominal pain, vomiting, diarrhea, lightheadedness, dysuria, hematuria, hematochezia, or melena.  She states that her parents have a history of CHF and MI.  Patient denies any pleuritic chest pain.  She denies any recent surgeries or recent travel.  She denies any history of blood clots.  She is not on blood thinners.  She denies any leg swelling or pain.   2245 25 2300 25 2315 25 2330   BP:   110/70    Pulse: 92 90 92 89   Resp: 21 17 21 21   Temp:   97.5 °F (36.4 °C)    TempSrc:   Oral    SpO2: 99% 99% 98% 98%   Weight:       Height:           Patient was given the following medications:  Medications   sodium chloride 0.9 % bolus 1,000 mL (1,000 mLs IntraVENous New Bag 3/28/25 2329)           Medical Decision Making/Differential Diagnosis:    CC/HPI Summary, Social Determinants of health, Records Reviewed, DDx, testing done/not done, ED Course, Reassessment, disposition considerations/shared decision making with patient, consults, disposition:      ED Course as of 25 0051   Fri Mar 28, 2025   2344 EKG:  This EKG is signed and interpreted by me.    Rate: 84  Rhythm: Sinus  Interpretation: Normal sinus rhythm, normal axis, left atrial lodgment, no acute elevations or depressions, normals are normal, QTc is 449  Comparison: no previous EKG available    [KG]   Sat Mar 29, 2025   0046 On reassessment patient feeling well this time.  Updated her on all results and answered all questions.  She states that she is not having any chest pain or palpitations at this time.  Will have her follow-up with her PCP.  Will have her return to the ED symptoms worsen or if new symptoms arise.  She is in agreement with this plan and has no further questions at this time. [NM]      ED Course User Index  [KG] Kori Palomo DO  [NM] Javier Jc DO          Patient is a  32-week pregnant 29 y.o. female with past medical history of hyperemesis gravidarum, borderline personality, ADHD, thyroid nodule, chronic migraines, ADHD who presents to the ED with palpitations with associated shortness of breath that occurred around 20:30 this evening (3/28/2025) as well as cough and rhinorrhea for the past 2 days.     On arrival to the ED, patient is awake, alert, oriented and in no acute distress.  Vital signs show /71 and a pulse of 88 bpm.  On my exam

## 2025-03-29 NOTE — DISCHARGE INSTRUCTIONS
Please follow-up with your PCP.  Please call to set up cardiology.  Please return to the ED symptoms worsen or if new symptoms arise.  Thank you.

## 2025-03-30 LAB
MICROORGANISM SPEC CULT: NO GROWTH
SERVICE CMNT-IMP: NORMAL
SPECIMEN DESCRIPTION: NORMAL

## 2025-03-31 LAB
EKG ATRIAL RATE: 84 BPM
EKG P AXIS: 69 DEGREES
EKG P-R INTERVAL: 148 MS
EKG Q-T INTERVAL: 380 MS
EKG QRS DURATION: 84 MS
EKG QTC CALCULATION (BAZETT): 449 MS
EKG R AXIS: 49 DEGREES
EKG T AXIS: 31 DEGREES
EKG VENTRICULAR RATE: 84 BPM

## 2025-03-31 PROCEDURE — 93010 ELECTROCARDIOGRAM REPORT: CPT | Performed by: INTERNAL MEDICINE

## 2025-04-02 ENCOUNTER — ROUTINE PRENATAL (OUTPATIENT)
Dept: OBGYN CLINIC | Age: 30
End: 2025-04-02
Payer: COMMERCIAL

## 2025-04-02 ENCOUNTER — ANCILLARY PROCEDURE (OUTPATIENT)
Dept: OBGYN CLINIC | Age: 30
End: 2025-04-02
Payer: COMMERCIAL

## 2025-04-02 VITALS
RESPIRATION RATE: 16 BRPM | BODY MASS INDEX: 23.37 KG/M2 | HEART RATE: 78 BPM | DIASTOLIC BLOOD PRESSURE: 81 MMHG | WEIGHT: 172.31 LBS | OXYGEN SATURATION: 98 % | SYSTOLIC BLOOD PRESSURE: 120 MMHG | TEMPERATURE: 98.2 F

## 2025-04-02 DIAGNOSIS — Z36.9 FIRST TRIMESTER SCREENING: ICD-10-CM

## 2025-04-02 DIAGNOSIS — O36.80X0 ENCOUNTER TO DETERMINE FETAL VIABILITY OF PREGNANCY, SINGLE OR UNSPECIFIED FETUS: Primary | ICD-10-CM

## 2025-04-02 LAB
GLUCOSE URINE, POC: NEGATIVE MG/DL
PROTEIN UA: NEGATIVE

## 2025-04-02 PROCEDURE — 81002 URINALYSIS NONAUTO W/O SCOPE: CPT | Performed by: OBSTETRICS & GYNECOLOGY

## 2025-04-02 PROCEDURE — 76801 OB US < 14 WKS SINGLE FETUS: CPT | Performed by: OBSTETRICS & GYNECOLOGY

## 2025-04-02 PROCEDURE — 76813 OB US NUCHAL MEAS 1 GEST: CPT | Performed by: OBSTETRICS & GYNECOLOGY

## 2025-04-02 PROCEDURE — 99999 PR OFFICE/OUTPT VISIT,PROCEDURE ONLY: CPT | Performed by: OBSTETRICS & GYNECOLOGY

## 2025-04-02 PROCEDURE — 99213 OFFICE O/P EST LOW 20 MIN: CPT | Performed by: OBSTETRICS & GYNECOLOGY

## 2025-04-02 NOTE — PROGRESS NOTES
MFM Note    The patient presented today for an ultrasound only.    Please see the ultrasound report for details.

## 2025-04-02 NOTE — PROGRESS NOTES
Patient presents to office today for dating and viability of pregnancy.  LMP--patient states that she is unsure of the exact date but knows in January.  Patient states no concerns or complaints at this time.

## 2025-04-09 ENCOUNTER — TRANSCRIBE ORDERS (OUTPATIENT)
Dept: ADMISSION | Age: 30
End: 2025-04-09

## 2025-04-09 ENCOUNTER — INITIAL PRENATAL (OUTPATIENT)
Dept: OBGYN | Age: 30
End: 2025-04-09
Payer: COMMERCIAL

## 2025-04-09 ENCOUNTER — HOSPITAL ENCOUNTER (OUTPATIENT)
Dept: LAB | Age: 30
Discharge: HOME | End: 2025-04-09
Payer: COMMERCIAL

## 2025-04-09 ENCOUNTER — HOSPITAL ENCOUNTER (OUTPATIENT)
Age: 30
Discharge: HOME | End: 2025-04-09
Payer: COMMERCIAL

## 2025-04-09 VITALS
SYSTOLIC BLOOD PRESSURE: 116 MMHG | HEART RATE: 88 BPM | RESPIRATION RATE: 18 BRPM | WEIGHT: 172.2 LBS | BODY MASS INDEX: 23.32 KG/M2 | DIASTOLIC BLOOD PRESSURE: 76 MMHG | HEIGHT: 72 IN | OXYGEN SATURATION: 99 % | TEMPERATURE: 97.2 F

## 2025-04-09 DIAGNOSIS — R00.0 TACHYCARDIA: ICD-10-CM

## 2025-04-09 DIAGNOSIS — F32.A DEPRESSION AFFECTING PREGNANCY: ICD-10-CM

## 2025-04-09 DIAGNOSIS — Z34.82 PRENATAL CARE, SUBSEQUENT PREGNANCY, SECOND TRIMESTER: Primary | ICD-10-CM

## 2025-04-09 DIAGNOSIS — R82.90 BAD ODOR OF URINE: ICD-10-CM

## 2025-04-09 DIAGNOSIS — O99.340 DEPRESSION AFFECTING PREGNANCY: ICD-10-CM

## 2025-04-09 DIAGNOSIS — R11.0 NAUSEA IN ADULT: ICD-10-CM

## 2025-04-09 DIAGNOSIS — Z34.82 ENCOUNTER FOR SUPERVISION OF OTHER NORMAL PREGNANCY, SECOND TRIMESTER: ICD-10-CM

## 2025-04-09 DIAGNOSIS — E04.9 ENLARGED THYROID: ICD-10-CM

## 2025-04-09 DIAGNOSIS — Z3A.14 14 WEEKS GESTATION OF PREGNANCY: ICD-10-CM

## 2025-04-09 DIAGNOSIS — O21.9 NAUSEA AND VOMITING DURING PREGNANCY: ICD-10-CM

## 2025-04-09 DIAGNOSIS — Z3A.14 14 WEEKS GESTATION OF PREGNANCY: Primary | ICD-10-CM

## 2025-04-09 LAB
ABO + RH BLD: NORMAL
BLOOD BANK SAMPLE EXPIRATION: NORMAL
BLOOD GROUP ANTIBODIES SERPL: NEGATIVE
ERYTHROCYTE [DISTWIDTH] IN BLOOD BY AUTOMATED COUNT: 11.9 % (ref 11.5–15)
GLUCOSE URINE, POC: NORMAL MG/DL
HCT VFR BLD AUTO: 36.1 % (ref 34–48)
HGB BLD-MCNC: 12.3 G/DL (ref 11.5–15.5)
MCH RBC QN AUTO: 32.9 PG (ref 26–35)
MCHC RBC AUTO-ENTMCNC: 34.1 G/DL (ref 32–34.5)
MCV RBC AUTO: 96.5 FL (ref 80–99.9)
PLATELET # BLD AUTO: 249 K/UL (ref 130–450)
PMV BLD AUTO: 9.9 FL (ref 7–12)
PROTEIN UA: NEGATIVE
RBC # BLD AUTO: 3.74 M/UL (ref 3.5–5.5)
TSH SERPL DL<=0.05 MIU/L-ACNC: 0.05 UIU/ML (ref 0.27–4.2)
WBC OTHER # BLD: 8.5 K/UL (ref 4.5–11.5)

## 2025-04-09 PROCEDURE — 81002 URINALYSIS NONAUTO W/O SCOPE: CPT | Performed by: MIDWIFE

## 2025-04-09 PROCEDURE — 83020 HEMOGLOBIN ELECTROPHORESIS: CPT

## 2025-04-09 PROCEDURE — 86803 HEPATITIS C AB TEST: CPT

## 2025-04-09 PROCEDURE — 84443 ASSAY THYROID STIM HORMONE: CPT

## 2025-04-09 PROCEDURE — 86592 SYPHILIS TEST NON-TREP QUAL: CPT

## 2025-04-09 PROCEDURE — 87340 HEPATITIS B SURFACE AG IA: CPT

## 2025-04-09 PROCEDURE — 36415 COLL VENOUS BLD VENIPUNCTURE: CPT

## 2025-04-09 PROCEDURE — 86901 BLOOD TYPING SEROLOGIC RH(D): CPT

## 2025-04-09 PROCEDURE — 86900 BLOOD TYPING SEROLOGIC ABO: CPT

## 2025-04-09 PROCEDURE — 85027 COMPLETE CBC AUTOMATED: CPT

## 2025-04-09 PROCEDURE — 99203 OFFICE O/P NEW LOW 30 MIN: CPT | Performed by: MIDWIFE

## 2025-04-09 PROCEDURE — 87389 HIV-1 AG W/HIV-1&-2 AB AG IA: CPT

## 2025-04-09 PROCEDURE — 86787 VARICELLA-ZOSTER ANTIBODY: CPT

## 2025-04-09 PROCEDURE — 86762 RUBELLA ANTIBODY: CPT

## 2025-04-09 PROCEDURE — 86850 RBC ANTIBODY SCREEN: CPT

## 2025-04-09 RX ORDER — ONDANSETRON 4 MG/1
4 TABLET, ORALLY DISINTEGRATING ORAL 3 TIMES DAILY PRN
Qty: 30 TABLET | Refills: 0 | Status: SHIPPED | OUTPATIENT
Start: 2025-04-09

## 2025-04-09 RX ORDER — DOXYLAMINE SUCCINATE AND PYRIDOXINE HYDROCHLORIDE, DELAYED RELEASE TABLETS 10 MG/10 MG 10; 10 MG/1; MG/1
2 TABLET, DELAYED RELEASE ORAL NIGHTLY PRN
Qty: 60 TABLET | Refills: 0 | Status: SHIPPED | OUTPATIENT
Start: 2025-04-09

## 2025-04-09 RX ORDER — VALACYCLOVIR HYDROCHLORIDE 500 MG/1
500 TABLET, FILM COATED ORAL DAILY
Qty: 30 TABLET | Refills: 11 | Status: SHIPPED | OUTPATIENT
Start: 2025-04-09

## 2025-04-09 ASSESSMENT — PATIENT HEALTH QUESTIONNAIRE - PHQ9
2. FEELING DOWN, DEPRESSED OR HOPELESS: SEVERAL DAYS
5. POOR APPETITE OR OVEREATING: SEVERAL DAYS
10. IF YOU CHECKED OFF ANY PROBLEMS, HOW DIFFICULT HAVE THESE PROBLEMS MADE IT FOR YOU TO DO YOUR WORK, TAKE CARE OF THINGS AT HOME, OR GET ALONG WITH OTHER PEOPLE: SOMEWHAT DIFFICULT
8. MOVING OR SPEAKING SO SLOWLY THAT OTHER PEOPLE COULD HAVE NOTICED. OR THE OPPOSITE, BEING SO FIGETY OR RESTLESS THAT YOU HAVE BEEN MOVING AROUND A LOT MORE THAN USUAL: NOT AT ALL
3. TROUBLE FALLING OR STAYING ASLEEP: NEARLY EVERY DAY
7. TROUBLE CONCENTRATING ON THINGS, SUCH AS READING THE NEWSPAPER OR WATCHING TELEVISION: NOT AT ALL
4. FEELING TIRED OR HAVING LITTLE ENERGY: NEARLY EVERY DAY
SUM OF ALL RESPONSES TO PHQ QUESTIONS 1-9: 10
9. THOUGHTS THAT YOU WOULD BE BETTER OFF DEAD, OR OF HURTING YOURSELF: NOT AT ALL
1. LITTLE INTEREST OR PLEASURE IN DOING THINGS: SEVERAL DAYS
6. FEELING BAD ABOUT YOURSELF - OR THAT YOU ARE A FAILURE OR HAVE LET YOURSELF OR YOUR FAMILY DOWN: SEVERAL DAYS
SUM OF ALL RESPONSES TO PHQ QUESTIONS 1-9: 10

## 2025-04-09 NOTE — PROGRESS NOTES
Pt presents for initial prenatal visit.  LMP was 1/7/25  Dating US preformed with MFM  Last pap preformed 2020  Urine negative for glucose and protein   Pt denies cramping, bleeding, or leaking of fluids.   Ht: 149  Pharmacy and medications reviewed and verified with pt.   Pt has no other concerns at this time.

## 2025-04-09 NOTE — PROGRESS NOTES
HISTORY OF PRESENT ILLNESS:  Pooja Berger is a 29 y.o. female , Patient's last menstrual period was 2025 (approximate).,  at 14w2d.   Hx depression, postpartum was bad, didn't have supportive partner with first baby.  Feels more supported this time.  Sees counselor at [facility],     [employer]esare[employer] only thing that helped her in the past.  Stopped in October due to insurance  Back on her insurance now    Partner had primary HSV outbreak recently, patient wants suppressive therapy now, she hasn't ever had outbreak     Moving to [place] prior to delivery    Cardiology consult for SVT, was seen in the ER.  At night when she lays down she has rapid heartbeat, isn't sure if she is having panic attack      Pregnancy complicated by:   Patient Active Problem List   Diagnosis Code    Right thyroid nodule E04.1    Chronic migraine without aura without status migrainosus, not intractable G43.709    ADHD (attention deficit hyperactivity disorder) F90.9    Adjustment disorder with mixed disturbance of emotions and conduct F43.25    Borderline personality disorder (HCC) F60.3    Irregular menstrual cycle N92.6    Mild hyperemesis gravidarum, antepartum O21.0       PAST OB HISTORY  OB History          3    Para   1    Term   1            AB   1    Living   1         SAB   1    IAB        Ectopic        Molar        Multiple        Live Births   1                Past Medical History:          Diagnosis Date    Abnormal Pap smear of cervix     ADHD (attention deficit hyperactivity disorder) 2016    Depression     Headache     Hypertension     Postpartum depression        Past Surgical History:          Procedure Laterality Date    COLPOSCOPY      DILATION AND CURETTAGE      DILATION AND CURETTAGE OF UTERUS N/A 10/27/2019    DILATATION AND CURETTAGE SUCTION performed by Claude Simmons MD at [facility] OR    [facility]      WISDOM TOOTH EXTRACTION         Social History:    TOBACCO:   reports that she has been smoking

## 2025-04-10 LAB
CULTURE: NORMAL
CULTURE: NORMAL
HBV SURFACE AG SERPL QL IA: NONREACTIVE
HCV AB SERPL QL IA: NONREACTIVE
HIV 1+2 AB+HIV1 P24 AG SERPL QL IA: NONREACTIVE
RPR SER QL: NONREACTIVE
RUBV IGG SERPL QL IA: NORMAL IU/ML
SPECIMEN DESCRIPTION: NORMAL
VZV IGG SER QL IA: ABNORMAL

## 2025-04-11 LAB
C. TRACHOMATIS DNA ,URINE: NEGATIVE
HGB ELECTROPHORESIS INTERP: NORMAL
N. GONORRHOEAE DNA, URINE: NEGATIVE
PATHOLOGIST: NORMAL

## 2025-04-12 ENCOUNTER — RESULTS FOLLOW-UP (OUTPATIENT)
Dept: OBGYN | Age: 30
End: 2025-04-12

## 2025-04-12 DIAGNOSIS — E04.1 RIGHT THYROID NODULE: Primary | ICD-10-CM

## 2025-04-12 DIAGNOSIS — R79.89 LOW TSH LEVEL: Primary | ICD-10-CM

## 2025-04-16 LAB
GYNECOLOGY CYTOLOGY REPORT: NORMAL
HPV SAMPLE: NORMAL
HPV SOURCE: NORMAL
HPV, GENOTYPE 16: NOT DETECTED
HPV, GENOTYPE 18: NOT DETECTED
HPV, HIGH RISK OTHER: NOT DETECTED
HPV, INTERPRETATION: NORMAL
Lab: NORMAL
NTRA 22Q11.2 DELETION SYNDROME POPULATION-BASED RISK TEXT: NORMAL
NTRA 22Q11.2 DELETION SYNDROME RESULT TEXT: NORMAL
NTRA 22Q11.2 DELETION SYNDROME RISK SCORE TEXT: NORMAL
NTRA FETAL FRACTION: NORMAL
NTRA GENDER OF FETUS: NORMAL
NTRA MONOSOMY X AGE-BASED RISK TEXT: NORMAL
NTRA MONOSOMY X RESULT TEXT: NORMAL
NTRA MONOSOMY X RISK SCORE TEXT: NORMAL
NTRA TRIPLOIDY RESULT TEXT: NORMAL
NTRA TRISOMY 13 AGE-BASED RISK TEXT: NORMAL
NTRA TRISOMY 13 RESULT TEXT: NORMAL
NTRA TRISOMY 13 RISK SCORE TEXT: NORMAL
NTRA TRISOMY 18 AGE-BASED RISK TEXT: NORMAL
NTRA TRISOMY 18 RESULT TEXT: NORMAL
NTRA TRISOMY 18 RISK SCORE TEXT: NORMAL
NTRA TRISOMY 21 AGE-BASED RISK TEXT: NORMAL
NTRA TRISOMY 21 RESULT TEXT: NORMAL
NTRA TRISOMY 21 RISK SCORE TEXT: NORMAL

## 2025-04-17 LAB
Lab: NEGATIVE
Lab: NORMAL
NTRA ALPHA-THALASSEMIA: NEGATIVE
NTRA BETA-HEMOGLOBINOPATHIES: NEGATIVE
NTRA CANAVAN DISEASE: NEGATIVE
NTRA CYSTIC FIBROSIS: NEGATIVE
NTRA DUCHENNE/BECKER MUSCULAR DYSTROPHY: NEGATIVE
NTRA FAMILIAL DYSAUTONOMIA: NEGATIVE
NTRA FRAGILE X SYNDROME: NEGATIVE
NTRA GALACTOSEMIA: NEGATIVE
NTRA GAUCHER DISEASE: NEGATIVE
NTRA MEDIUM CHAIN ACYL-COA DEHYDROGENASE DEFICIENCY: NEGATIVE
NTRA POLYCYSTIC KIDNEY DISEASE, AUTOSOMAL RECESSIVE: NEGATIVE
NTRA SMITH-LEMLI-OPITZ SYNDROME: NEGATIVE
NTRA SPINAL MUSCULAR ATROPHY: NEGATIVE
NTRA TAY-SACHS DISEASE: NEGATIVE

## 2025-04-18 ENCOUNTER — RESULTS FOLLOW-UP (OUTPATIENT)
Dept: OBGYN | Age: 30
End: 2025-04-18

## 2025-04-21 ENCOUNTER — TELEPHONE (OUTPATIENT)
Dept: CARDIOLOGY CLINIC | Age: 30
End: 2025-04-21

## 2025-04-21 ENCOUNTER — TELEPHONE (OUTPATIENT)
Dept: OBGYN | Age: 30
End: 2025-04-21

## 2025-04-21 NOTE — TELEPHONE ENCOUNTER
----- Message from Ingrid WOLF sent at 4/21/2025  5:02 PM EDT -----  Non-immune to varicella.  Avoid anyone with chicken pox or shingles.  Vaccine can be administered after delivery

## 2025-04-21 NOTE — RESULT ENCOUNTER NOTE
Non-immune to varicella.  Avoid anyone with chicken pox or shingles.  Vaccine can be administered after delivery

## 2025-04-30 ENCOUNTER — HOSPITAL ENCOUNTER (OUTPATIENT)
Dept: ULTRASOUND IMAGING | Age: 30
Discharge: HOME | End: 2025-05-02
Payer: COMMERCIAL

## 2025-04-30 DIAGNOSIS — E04.9 ENLARGED THYROID: ICD-10-CM

## 2025-04-30 PROCEDURE — 76536 US EXAM OF HEAD AND NECK: CPT

## 2025-05-02 NOTE — RESULT ENCOUNTER NOTE
1.  Consult endocrinology to discuss US and lab results.  Order placed  2.  TSH a little low, T4 free has been ordered

## 2025-05-05 ENCOUNTER — ROUTINE PRENATAL (OUTPATIENT)
Dept: OBGYN | Age: 30
End: 2025-05-05
Payer: COMMERCIAL

## 2025-05-05 VITALS
SYSTOLIC BLOOD PRESSURE: 109 MMHG | HEART RATE: 90 BPM | OXYGEN SATURATION: 96 % | TEMPERATURE: 98.2 F | RESPIRATION RATE: 20 BRPM | WEIGHT: 175 LBS | BODY MASS INDEX: 23.73 KG/M2 | DIASTOLIC BLOOD PRESSURE: 72 MMHG

## 2025-05-05 DIAGNOSIS — Z3A.18 18 WEEKS GESTATION OF PREGNANCY: Primary | ICD-10-CM

## 2025-05-05 LAB
GLUCOSE URINE, POC: NEGATIVE MG/DL
PROTEIN UA: NEGATIVE

## 2025-05-05 PROCEDURE — 0502F SUBSEQUENT PRENATAL CARE: CPT | Performed by: MIDWIFE

## 2025-05-05 PROCEDURE — 81002 URINALYSIS NONAUTO W/O SCOPE: CPT | Performed by: MIDWIFE

## 2025-05-05 PROCEDURE — 99213 OFFICE O/P EST LOW 20 MIN: CPT | Performed by: MIDWIFE
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
